# Patient Record
Sex: FEMALE | Employment: OTHER | URBAN - METROPOLITAN AREA
[De-identification: names, ages, dates, MRNs, and addresses within clinical notes are randomized per-mention and may not be internally consistent; named-entity substitution may affect disease eponyms.]

---

## 2023-12-07 DIAGNOSIS — I10 PRIMARY HYPERTENSION: Primary | ICD-10-CM

## 2023-12-07 RX ORDER — SPIRONOLACTONE 50 MG/1
50 TABLET, FILM COATED ORAL DAILY
Qty: 30 TABLET | Refills: 0 | Status: SHIPPED | OUTPATIENT
Start: 2023-12-07 | End: 2024-01-04 | Stop reason: SDUPTHER

## 2023-12-07 RX ORDER — SPIRONOLACTONE 50 MG/1
50 TABLET, FILM COATED ORAL DAILY
COMMUNITY
Start: 2021-07-01 | End: 2023-12-07 | Stop reason: SDUPTHER

## 2023-12-13 ENCOUNTER — ANCILLARY PROCEDURE (OUTPATIENT)
Dept: RADIOLOGY | Facility: CLINIC | Age: 78
End: 2023-12-13
Payer: MEDICARE

## 2023-12-13 ENCOUNTER — LAB (OUTPATIENT)
Dept: LAB | Facility: LAB | Age: 78
End: 2023-12-13
Payer: MEDICARE

## 2023-12-13 ENCOUNTER — OFFICE VISIT (OUTPATIENT)
Dept: PRIMARY CARE | Facility: CLINIC | Age: 78
End: 2023-12-13
Payer: MEDICARE

## 2023-12-13 VITALS
BODY MASS INDEX: 36.57 KG/M2 | HEART RATE: 60 BPM | SYSTOLIC BLOOD PRESSURE: 138 MMHG | HEIGHT: 63 IN | TEMPERATURE: 96 F | WEIGHT: 206.4 LBS | DIASTOLIC BLOOD PRESSURE: 80 MMHG | OXYGEN SATURATION: 99 %

## 2023-12-13 DIAGNOSIS — E78.2 HYPERLIPIDEMIA, MIXED: ICD-10-CM

## 2023-12-13 DIAGNOSIS — E78.2 MIXED HYPERLIPIDEMIA: ICD-10-CM

## 2023-12-13 DIAGNOSIS — M54.50 ACUTE BILATERAL LOW BACK PAIN WITHOUT SCIATICA: ICD-10-CM

## 2023-12-13 DIAGNOSIS — I10 PRIMARY HYPERTENSION: ICD-10-CM

## 2023-12-13 DIAGNOSIS — E78.2 HYPERLIPIDEMIA, MIXED: Primary | ICD-10-CM

## 2023-12-13 PROBLEM — N18.32 STAGE 3B CHRONIC KIDNEY DISEASE (MULTI): Status: ACTIVE | Noted: 2023-12-13

## 2023-12-13 PROBLEM — E55.9 VITAMIN D DEFICIENCY: Status: ACTIVE | Noted: 2023-12-13

## 2023-12-13 PROBLEM — D63.0 ANEMIA IN NEOPLASTIC DISEASE: Status: ACTIVE | Noted: 2022-06-22

## 2023-12-13 PROBLEM — M51.16 LUMBAR DISC DISEASE WITH RADICULOPATHY: Status: ACTIVE | Noted: 2023-12-13

## 2023-12-13 PROBLEM — G62.9 PERIPHERAL NEUROPATHY: Status: ACTIVE | Noted: 2023-12-13

## 2023-12-13 PROBLEM — H35.033 HYPERTENSIVE RETINOPATHY, BILATERAL: Status: ACTIVE | Noted: 2019-02-15

## 2023-12-13 PROBLEM — M25.50 AROMATASE INHIBITOR-ASSOCIATED ARTHRALGIA: Status: ACTIVE | Noted: 2023-07-07

## 2023-12-13 PROBLEM — J31.0 CHRONIC RHINITIS: Status: ACTIVE | Noted: 2023-12-13

## 2023-12-13 PROBLEM — Z85.3 HISTORY OF MALIGNANT NEOPLASM OF BREAST: Status: ACTIVE | Noted: 2022-06-22

## 2023-12-13 PROBLEM — Z90.11 S/P RIGHT MASTECTOMY: Status: ACTIVE | Noted: 2023-01-04

## 2023-12-13 PROBLEM — H25.013 CORTICAL SENILE CATARACT OF BOTH EYES: Status: ACTIVE | Noted: 2019-02-15

## 2023-12-13 PROBLEM — T45.1X5A AROMATASE INHIBITOR-ASSOCIATED ARTHRALGIA: Status: ACTIVE | Noted: 2023-07-07

## 2023-12-13 PROBLEM — H35.89 RPE MOTTLING OF MACULA: Status: ACTIVE | Noted: 2019-02-15

## 2023-12-13 PROBLEM — E83.52 HYPERCALCEMIA: Status: ACTIVE | Noted: 2023-12-13

## 2023-12-13 PROBLEM — R60.0 BILATERAL LOWER EXTREMITY EDEMA: Status: ACTIVE | Noted: 2023-07-07

## 2023-12-13 PROBLEM — E66.9 OBESITY WITH BODY MASS INDEX 30 OR GREATER: Status: ACTIVE | Noted: 2023-12-13

## 2023-12-13 PROBLEM — H16.223 KERATOCONJUNCTIVITIS SICCA, NOT SPECIFIED AS SJOGREN'S, BILATERAL: Status: ACTIVE | Noted: 2019-02-15

## 2023-12-13 PROBLEM — I89.0 LYMPHEDEMA OF RIGHT ARM: Status: ACTIVE | Noted: 2023-07-07

## 2023-12-13 PROBLEM — C50.911 MALIGNANT NEOPLASM OF RIGHT FEMALE BREAST (MULTI): Status: ACTIVE | Noted: 2019-02-15

## 2023-12-13 LAB
ALT SERPL W P-5'-P-CCNC: 16 U/L (ref 7–45)
ANION GAP SERPL CALC-SCNC: 16 MMOL/L (ref 10–20)
AST SERPL W P-5'-P-CCNC: 17 U/L (ref 9–39)
BASOPHILS # BLD AUTO: 0.04 X10*3/UL (ref 0–0.1)
BASOPHILS NFR BLD AUTO: 0.7 %
BUN SERPL-MCNC: 36 MG/DL (ref 6–23)
CALCIUM SERPL-MCNC: 10 MG/DL (ref 8.6–10.6)
CHLORIDE SERPL-SCNC: 105 MMOL/L (ref 98–107)
CHOLEST SERPL-MCNC: 208 MG/DL (ref 0–199)
CHOLESTEROL/HDL RATIO: 3.3
CO2 SERPL-SCNC: 23 MMOL/L (ref 21–32)
CREAT SERPL-MCNC: 1.17 MG/DL (ref 0.5–1.05)
EOSINOPHIL # BLD AUTO: 0.12 X10*3/UL (ref 0–0.4)
EOSINOPHIL NFR BLD AUTO: 2.2 %
ERYTHROCYTE [DISTWIDTH] IN BLOOD BY AUTOMATED COUNT: 13.8 % (ref 11.5–14.5)
GFR SERPL CREATININE-BSD FRML MDRD: 48 ML/MIN/1.73M*2
GLUCOSE SERPL-MCNC: 99 MG/DL (ref 74–99)
HCT VFR BLD AUTO: 40.9 % (ref 36–46)
HDLC SERPL-MCNC: 62.7 MG/DL
HGB BLD-MCNC: 12.8 G/DL (ref 12–16)
IMM GRANULOCYTES # BLD AUTO: 0.02 X10*3/UL (ref 0–0.5)
IMM GRANULOCYTES NFR BLD AUTO: 0.4 % (ref 0–0.9)
LDLC SERPL CALC-MCNC: 111 MG/DL
LYMPHOCYTES # BLD AUTO: 1.51 X10*3/UL (ref 0.8–3)
LYMPHOCYTES NFR BLD AUTO: 28.1 %
MCH RBC QN AUTO: 30.8 PG (ref 26–34)
MCHC RBC AUTO-ENTMCNC: 31.3 G/DL (ref 32–36)
MCV RBC AUTO: 99 FL (ref 80–100)
MONOCYTES # BLD AUTO: 0.7 X10*3/UL (ref 0.05–0.8)
MONOCYTES NFR BLD AUTO: 13 %
NEUTROPHILS # BLD AUTO: 2.99 X10*3/UL (ref 1.6–5.5)
NEUTROPHILS NFR BLD AUTO: 55.6 %
NON HDL CHOLESTEROL: 145 MG/DL (ref 0–149)
NRBC BLD-RTO: 0 /100 WBCS (ref 0–0)
PLATELET # BLD AUTO: 283 X10*3/UL (ref 150–450)
POTASSIUM SERPL-SCNC: 4.7 MMOL/L (ref 3.5–5.3)
RBC # BLD AUTO: 4.15 X10*6/UL (ref 4–5.2)
SODIUM SERPL-SCNC: 139 MMOL/L (ref 136–145)
TRIGL SERPL-MCNC: 170 MG/DL (ref 0–149)
VLDL: 34 MG/DL (ref 0–40)
WBC # BLD AUTO: 5.4 X10*3/UL (ref 4.4–11.3)

## 2023-12-13 PROCEDURE — 85025 COMPLETE CBC W/AUTO DIFF WBC: CPT

## 2023-12-13 PROCEDURE — 99214 OFFICE O/P EST MOD 30 MIN: CPT | Performed by: INTERNAL MEDICINE

## 2023-12-13 PROCEDURE — 3075F SYST BP GE 130 - 139MM HG: CPT | Performed by: INTERNAL MEDICINE

## 2023-12-13 PROCEDURE — 1159F MED LIST DOCD IN RCRD: CPT | Performed by: INTERNAL MEDICINE

## 2023-12-13 PROCEDURE — 84450 TRANSFERASE (AST) (SGOT): CPT

## 2023-12-13 PROCEDURE — 1160F RVW MEDS BY RX/DR IN RCRD: CPT | Performed by: INTERNAL MEDICINE

## 2023-12-13 PROCEDURE — 80061 LIPID PANEL: CPT

## 2023-12-13 PROCEDURE — 72100 X-RAY EXAM L-S SPINE 2/3 VWS: CPT | Mod: FY

## 2023-12-13 PROCEDURE — 72100 X-RAY EXAM L-S SPINE 2/3 VWS: CPT | Performed by: RADIOLOGY

## 2023-12-13 PROCEDURE — 1036F TOBACCO NON-USER: CPT | Performed by: INTERNAL MEDICINE

## 2023-12-13 PROCEDURE — 80048 BASIC METABOLIC PNL TOTAL CA: CPT

## 2023-12-13 PROCEDURE — 36415 COLL VENOUS BLD VENIPUNCTURE: CPT

## 2023-12-13 PROCEDURE — 3079F DIAST BP 80-89 MM HG: CPT | Performed by: INTERNAL MEDICINE

## 2023-12-13 PROCEDURE — 84460 ALANINE AMINO (ALT) (SGPT): CPT

## 2023-12-13 RX ORDER — BISOPROLOL FUMARATE 10 MG/1
TABLET, FILM COATED ORAL
COMMUNITY
Start: 2014-09-19 | End: 2024-01-24 | Stop reason: SDUPTHER

## 2023-12-13 RX ORDER — CHOLECALCIFEROL (VITAMIN D3) 50 MCG
1 TABLET ORAL DAILY
COMMUNITY
Start: 2019-01-14

## 2023-12-13 RX ORDER — LOTEPREDNOL ETABONATE 5 MG/G
OINTMENT OPHTHALMIC
COMMUNITY

## 2023-12-13 RX ORDER — BESIFLOXACIN 6 MG/ML
1 SUSPENSION OPHTHALMIC 3 TIMES DAILY
COMMUNITY
Start: 2022-05-04

## 2023-12-13 RX ORDER — MELOXICAM 7.5 MG/1
7.5 TABLET ORAL DAILY
Qty: 30 TABLET | Refills: 0 | Status: SHIPPED | OUTPATIENT
Start: 2023-12-13 | End: 2024-01-09

## 2023-12-13 RX ORDER — CALCIUM CARBONATE/VITAMIN D3 600MG-5MCG
1 TABLET ORAL DAILY
COMMUNITY
Start: 2015-10-06

## 2023-12-13 RX ORDER — TIZANIDINE 4 MG/1
4 TABLET ORAL EVERY 6 HOURS PRN
Qty: 30 TABLET | Refills: 0 | Status: SHIPPED | OUTPATIENT
Start: 2023-12-13 | End: 2023-12-23

## 2023-12-13 RX ORDER — LETROZOLE 2.5 MG/1
TABLET, FILM COATED ORAL
COMMUNITY
Start: 2019-01-02

## 2023-12-13 RX ORDER — CYCLOSPORINE 0.5 MG/ML
1 EMULSION OPHTHALMIC 2 TIMES DAILY
COMMUNITY

## 2023-12-13 RX ORDER — FENOFIBRATE 145 MG/1
TABLET, FILM COATED ORAL
COMMUNITY
Start: 2010-08-23 | End: 2024-01-24 | Stop reason: SDUPTHER

## 2023-12-13 RX ORDER — BROMFENAC SODIUM 0.7 MG/ML
1 SOLUTION/ DROPS OPHTHALMIC
COMMUNITY
Start: 2022-05-04

## 2023-12-13 ASSESSMENT — PATIENT HEALTH QUESTIONNAIRE - PHQ9
2. FEELING DOWN, DEPRESSED OR HOPELESS: NOT AT ALL
1. LITTLE INTEREST OR PLEASURE IN DOING THINGS: NOT AT ALL
SUM OF ALL RESPONSES TO PHQ9 QUESTIONS 1 AND 2: 0

## 2023-12-13 NOTE — PROGRESS NOTES
"Subjective   Patient ID: Christelle Paul is a 78 y.o. female who presents for Numbness (Pt has numbness in hands and toes, also has a back pain for about 2 weeks now.).    HPI   For 2 weeks pain in low back and hips.  No numbness actually.has stiffness  No fall  Was standing for a while and had spasm.  Pain gets worse with standing and walking.  No pain if she is sitting or lying down.  No bowel or bladder symptoms  Seen hematology oncology.  Mammogram is okay    Taking BP medications as prescribed.  Does not check at home.  No headache or dizziness or double vision  No neck pain now  No significant water retention unless she travels in an airplane    Taking fenofibrate.  No side effects  Continues to be on letrozole  Review of Systems  Constitutional: no fever, no chills and no recent weight gain.   ENT: no nasal discharge and no hoarseness.   Respiratory: no cough, no wheezing that is consistent with asthma, no orthopnea and no Postural Nocturnal Dyspnea.   Gastrointestinal: no abdominal pain, no constipation, no melena and no diarrhea.   Objective   Pulse 60   Temp 35.6 °C (96 °F)   Ht 1.6 m (5' 3\")   Wt 93.6 kg (206 lb 6.4 oz)   SpO2 99%   BMI 36.56 kg/m²     Physical Exam  In general, well-appearing, not in acute distress, alert and oriented.  HEENT: Pupils PERRLA. No pallor or icterus  Neck: No lymphadenopathy,. No enlargement of thyroid.No JVD  Chest: Clear to auscultation, good air entry.  CVS: S1 and S2 regular. No murmur, no gallop, S3 or S4. trace peripheral edema. No carotid bruit.  Abdomen: Soft, no tenderness, no hepatosplenomegaly.  Extremities: No calf tenderness. No knee or ankle effusion. No finger synovitis. No clubbing or cyanosis.  Psych: Mood and affect normal. Good judgment and insight  Neuro exam shows no focal findings.  Has difficulty getting up due to back pain.  Has tightness of gluteus muscles.  No spinal tenderness.  SLR negative bilateral.  Gait slow with a cane.  Not able to " elicit knee reflexes.  Does not have weakness.  Assessment/Plan   Problem List Items Addressed This Visit             ICD-10-CM    Hypertension I10    Relevant Orders    CBC and Auto Differential    Basic Metabolic Panel    Hyperlipidemia, mixed - Primary E78.2    Relevant Orders    CBC and Auto Differential    Lipid Panel    Basic Metabolic Panel     Other Visit Diagnoses         Codes    Mixed hyperlipidemia     E78.2    Relevant Orders    Alanine Aminotransferase    Aspartate Aminotransferase    Acute bilateral low back pain without sciatica     M54.50    Relevant Medications    tiZANidine (Zanaflex) 4 mg tablet    meloxicam (Mobic) 7.5 mg tablet    Other Relevant Orders    XR lumbar spine 2-3 views          Blood pressure is under control.  Continue same treatment  Will check renal function  Check lipids and liver enzymes  Continue fenofibrate  She is up-to-date with breast cancer follow-up  Patient has acute back pain and hip pain.  Will get an x-ray due to history of breast cancer  Likely myofascial  Will order meloxicam and muscle relaxer  Apply heat and do gentle Sarge and stretches  Will refer to physical therapy if symptoms are not improving in a couple of weeks  Schedule wellness exam

## 2023-12-14 ENCOUNTER — TELEPHONE (OUTPATIENT)
Dept: PRIMARY CARE | Facility: CLINIC | Age: 78
End: 2023-12-14
Payer: MEDICARE

## 2023-12-14 NOTE — TELEPHONE ENCOUNTER
----- Message from Margoth Ceballos MD sent at 12/14/2023  1:06 PM EST -----  Advise blood work is okay.  Continue same treatment plan    PT aware.

## 2023-12-14 NOTE — RESULT ENCOUNTER NOTE
Advise that x-ray shows arthritis changes.  L4-5 is slightly out of position .  If pain is not improving I recommend physical therapy

## 2023-12-15 ENCOUNTER — TELEPHONE (OUTPATIENT)
Dept: PRIMARY CARE | Facility: CLINIC | Age: 78
End: 2023-12-15
Payer: MEDICARE

## 2023-12-15 NOTE — TELEPHONE ENCOUNTER
----- Message from Margoth Ceballos MD sent at 12/14/2023  5:45 PM EST -----  Advise that x-ray shows arthritis changes.  L4-5 is slightly out of position .  If pain is not improving I recommend physical therapy    Left VM to call back for results.

## 2024-01-04 DIAGNOSIS — I10 PRIMARY HYPERTENSION: ICD-10-CM

## 2024-01-04 RX ORDER — SPIRONOLACTONE 50 MG/1
50 TABLET, FILM COATED ORAL DAILY
Qty: 90 TABLET | Refills: 3 | Status: SHIPPED | OUTPATIENT
Start: 2024-01-04 | End: 2025-01-03

## 2024-01-24 DIAGNOSIS — I10 PRIMARY HYPERTENSION: Primary | ICD-10-CM

## 2024-01-24 DIAGNOSIS — M54.50 ACUTE BILATERAL LOW BACK PAIN WITHOUT SCIATICA: ICD-10-CM

## 2024-01-24 DIAGNOSIS — E78.2 HYPERLIPIDEMIA, MIXED: ICD-10-CM

## 2024-01-24 RX ORDER — MELOXICAM 7.5 MG/1
7.5 TABLET ORAL DAILY
Qty: 30 TABLET | Refills: 0 | Status: SHIPPED | OUTPATIENT
Start: 2024-01-24 | End: 2024-02-07 | Stop reason: ALTCHOICE

## 2024-01-24 RX ORDER — BISOPROLOL FUMARATE 10 MG/1
10 TABLET, FILM COATED ORAL DAILY
Qty: 90 TABLET | Refills: 3 | Status: SHIPPED | OUTPATIENT
Start: 2024-01-24 | End: 2025-01-23

## 2024-01-24 RX ORDER — FENOFIBRATE 145 MG/1
145 TABLET, FILM COATED ORAL DAILY
Qty: 90 TABLET | Refills: 3 | Status: SHIPPED | OUTPATIENT
Start: 2024-01-24 | End: 2025-01-23

## 2024-02-07 ENCOUNTER — TELEPHONE (OUTPATIENT)
Dept: PRIMARY CARE | Facility: CLINIC | Age: 79
End: 2024-02-07
Payer: COMMERCIAL

## 2024-02-07 DIAGNOSIS — M51.16 LUMBAR DISC DISEASE WITH RADICULOPATHY: Primary | ICD-10-CM

## 2024-02-07 RX ORDER — GABAPENTIN 100 MG/1
100 CAPSULE ORAL 2 TIMES DAILY
Qty: 60 CAPSULE | Refills: 0 | Status: SHIPPED | OUTPATIENT
Start: 2024-02-07 | End: 2024-03-15

## 2024-02-07 NOTE — TELEPHONE ENCOUNTER
The Meloxicam prescribed on 1.24.24 is not helping for back pain. She was hoping for something stronger and a referral for Physical Therapy.

## 2024-02-28 ENCOUNTER — EVALUATION (OUTPATIENT)
Dept: PHYSICAL THERAPY | Facility: CLINIC | Age: 79
End: 2024-02-28
Payer: MEDICARE

## 2024-02-28 DIAGNOSIS — M51.16 LUMBAR DISC DISEASE WITH RADICULOPATHY: ICD-10-CM

## 2024-02-28 PROCEDURE — 97110 THERAPEUTIC EXERCISES: CPT | Mod: GP | Performed by: PHYSICAL THERAPIST

## 2024-02-28 PROCEDURE — 97161 PT EVAL LOW COMPLEX 20 MIN: CPT | Mod: GP | Performed by: PHYSICAL THERAPIST

## 2024-02-28 ASSESSMENT — COLUMBIA-SUICIDE SEVERITY RATING SCALE - C-SSRS
1. IN THE PAST MONTH, HAVE YOU WISHED YOU WERE DEAD OR WISHED YOU COULD GO TO SLEEP AND NOT WAKE UP?: NO
6. HAVE YOU EVER DONE ANYTHING, STARTED TO DO ANYTHING, OR PREPARED TO DO ANYTHING TO END YOUR LIFE?: NO
2. HAVE YOU ACTUALLY HAD ANY THOUGHTS OF KILLING YOURSELF?: NO

## 2024-02-28 ASSESSMENT — ENCOUNTER SYMPTOMS
OCCASIONAL FEELINGS OF UNSTEADINESS: 0
LOSS OF SENSATION IN FEET: 0
DEPRESSION: 0

## 2024-02-28 ASSESSMENT — PATIENT HEALTH QUESTIONNAIRE - PHQ9
1. LITTLE INTEREST OR PLEASURE IN DOING THINGS: NOT AT ALL
2. FEELING DOWN, DEPRESSED OR HOPELESS: NOT AT ALL
SUM OF ALL RESPONSES TO PHQ9 QUESTIONS 1 AND 2: 0

## 2024-02-28 NOTE — PROGRESS NOTES
Patient Name Christelle Paul  MRN: [id number]  Today's Date: 24  Time Calculation  Start Time: 0130  Stop Time: 215  Time Calculation (min): 45 min    Insurance:   Visit #: 1  Insurance Reviewed  (per information provided by  pre-cert team)   [facility] Certification date range:  24/24    Therapy Diagnoses:   1. Lumbar disc disease with radiculopathy  Referral to Physical Therapy        General:  Reason for visit: LS pain   Referred by: Margoth Gallegos MD appt:  prn after PT  Preferred Name:  Christelle  Script:  PT  Onset Date:  24  Most recent assessment/re-assessment: 24  Email/phone #:  prefers printed copy   Access Code: [email or web address]    Assessment:    Evolving with changing characteristics  Level of complexity:  low  Patient with flare up of OA in the LS, needs work on/Skilled PT for ROM, flexibility, dynamic stabilization, strength, posture, body mechanics and gait/stairs for improved overall function.       Problems:    Pain:  _x__  Posture/Body mechanics deficits:  _x__  Decreased knowledge HEP:  _x__  Decreased knowledge of Precautions:  _x__  Activity Limitations:   _x__  ADL's/IADL's/Self-care skills:  _x__  ROM/Joint Mobility:  _x__  Strength:  __x_  Decreased functional level:   _x__  Flexibility:  __x_  Tenderness/decreased soft tissue mobility:  __x_  Gait/Stairs:  ___x  Fall Risk:  __x_  Balance:  ___  Edema:  ___  Participation restrictions:  ___  Sensory:  ___  Transfers:  ___  Decreased knowledge of brace:   ___  Other:  ___    X Indicates included in problem list    Goals:    STG:    -Increased postural awareness  -Compliant with HEP.   LTG:  by discharge  -Increased postural awareness and posture WFL.  - pain to:  2/10 and patient I with self management of symptoms.   -Decreased pain and increased function with ADL's and IADL's.  Improve Oswestry  to: 20%  limitation of function.  -Normal gait on level and uneven surfaces community level distances for improved  "function in the community with least assistive device.  -Normal reciprocal pattern on stairs for improved function to all levels of home with rail.    -TUG to within norms for age for decreased risk for falls.  -Increase trunk AROM to WFL for improved function with dressing and driving.    -Increase trunk strength and stability and R/L LE strength to WFL for improved function with chores.  -Increase B LE flexibility to WFL.    -Decrease B /lower quarter tenderness 50-75% per patient report.  -I and compliant with HEP and proper: LE/lower back care.     Rehab potential to achieve the above goals is good.    Patient is aware of diagnosis and prognosis and agrees with established plan of care and goals.    Plan:   Skilled PT 1-2 x/week for 12 weeks( Until goals achieved, maximum rehab potential has been achieved, or patient has plateaued)  for:    Aquatics  __x___  CP   __x__  Dry needling  ____  Education  __x__  Electrical Stimulation  __x__  Gait training  __x__  HEP  __x__  Manual  _x___  Mechanical Traction  ____  NMR  __x__  Self-care/home management  __x__  Therapeutic Exercise   __x__  Therapeutic Activities  __x__  US  __x__  Work Conditioning  ____  Re-assessment  __x__  Other  ____    X Indicates included in treatment plan    PT for Nu-step for functional mobility and soft tissue warm up for more efficient stretching, work on ROM, flexibility, dynamic stabilization, strength, posture, body mechanics and gait/stairs for improved overall function.   Manual and modalities prn.     Subjective:  HPI: patient's son present for eval to help translate, and will try to come for appointments, but patient is able to understand English.  Patient \"crystal\" back in 12-23 and had onset of LS pain.  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  Patient had x-rays that showed mild OA and anterolisthesis L4-5.    Pain  Type of pain:  8/10 sharp LS  What " "increases pain: standing/walking  What decreases pain:  sitting/just started Gabapentin    Medical Hx/  Fall Risk:  low  Steadi:  4 yes  Precautions: s/p Breast CA 2012 with surgery, HTN, OA, L ankle fracture with ORIF, see meds in chart.     Human Trafficking risk:  No    Patient goal:  Decreased pain and increased function.   Patient is aware of diagnosis and prognosis.    Living Environment:  multi-level home with stairs with rail, first floor laundry  Social Support:  lives with:  son currently and likely long term  DME:    shower chair     Prior Function:   ambulated without device prior to onset    Function:    A and O x 3  Sleep:  WFL/ instructed in proper postures.  ADL's:  using chair to shower and has been for a while, sits to dress.   Chores:  pain with chores requiring standing.  Likes to garden.   Driving:  WNL  Work: retired  Recreation: out to shop and go to [employer], out with family, not going to [employer] currently due to difficulty with standing.   Sitting:  unlimited Standin'   Walkin' with cane    Objective:    Outcome Measures:  Other Measures  Oswestry Disablity Index (PAULIE): 38% limitation of function.     Posture:  moderately for flexed posture, increased lumbar lordosis and pelvic landmarks symmetrical.      Gait/Stairs: moderately decreased trunk rotation and hip extension, wide STEPHANIE, ambulates with straight cane, up and down stairs with step to pattern with rail and cane and either LE WB.      TU.2\" straight cane and B UE on arm rests.   normals:  60-69 years of age (7.1 - 9.0 seconds)  70 - 79 years of age: (8.2 - 10.2 seconds)**  80 - 99 years of age (10 - 12.7 seconds)    Palpation:  moderate tenderness B lumbar paraspinals and piriformis.  L > R distal LE pitting edema, per son and patient, MD is aware.      ROM:  Trunk Flexion: 10\" 3rd to floor  Extension: 0/12 pain  RSB:  WFL ff component  LSB:  WFL ff component  RR: 40%  LR: 40%    MMT:  Abd: 1+/5  Bridge: 10%  B LE " "myotomes:  WNL and symmetrical    Flexibility:  Hamstrings:  90/90:  R:  -28  L: -28  Heelcords:   0 B  Hip flexors: mod B  Gluts: mod B  Piriformis: mod B    Neuro:  (at re-assessment)    Treatment:    Evaluation:  30 minutes    **= HEP  NV= Next visit  np= not performed  nb= non-billable  G= group HEP= discharged to HEP    Therapeutic Exercise: 8  Nu-step(subjective taken/education):    NV    Standing hams and hip flexor/calf stretches: NV    Seated flexion stretch:  10/10\"**    LTR:  x 15/5\"**  B SKTC:  10/10\"**  B supine piriformis stretch:  3/30\"**    NMR:  2  T-band 3-way pull downs:  NV  T-band obliques: NV  T-band B pull for/back:  NV    Pelvic tilt:  10/5\"**  Bridges:  NV  PT hooklying with add set:  NV  PT hooklying with t-band abd:  NV     Education:  poc, anatomy, physiology, posture, body mechanics, safety awareness, HEP.  Preferred learning:  pictures, demonstration.  Demonstrated good understanding.                    Access Code: [email or web address]  URL: [email or web address]  Date: 02/28/2024  Prepared by: Narcisa Waggoner    Program Notes  Do these in bed not on the floor    Exercises  - Supine Lower Trunk Rotation  - 1 x daily - 7 x weekly - 2-3 sets - 10 reps  - Hooklying Single Knee to Chest  - 1 x daily - 7 x weekly - 1 sets - 10 reps - 10 second hold  - Supine Piriformis Stretch with Foot on Ground  - 1 x daily - 7 x weekly - 1 sets - 3 reps - 30 second hold  - Pelvic Tilt  - 1 x daily - 7 x weekly - 2-3 sets - 10 reps - 5 second hold  - Seated Lumbar Flexion Stretch  - 1 x daily - 7 x weekly - 1 sets - 10 reps - 10 second hold                    "

## 2024-02-28 NOTE — LETTER
February 28, 2024    N[place]aggoner[place] PT  [address] [facility] [address] [facility]  [facility] [address]    Patient: Christelle Paul   YOB: 1945   Date of Visit: 2/28/2024       Dear Margoth Ceballos MD  [address] [facility] [address] [address]  [place] [address]    The attached plan of care is being sent to you because your patient’s medical reimbursement requires that you certify the plan of care. Your signature is required to allow uninterrupted insurance coverage.      You may indicate your approval by signing below and faxing this form back to us at Dept Fax: [phone].    Please call Dept: [phone] with any questions or concerns.    Thank you for this referral,        Narcisa Waggoner, PT  [address] [address] [facility]  [address] [facility] [address]    Payer: Payor: MEDICARE / Plan: MEDICARE PART A AND B / Product Type: *No Product type* /                                                                         Date:     Dear Narcisa Waggoner, PT,     Re: Ms. Christelle Paul, MRN:[id number]    I certify that I have reviewed the attached plan of care and it is medically necessary for Ms. Christelle Paul (1945) who is under my care.          ______________________________________                    _________________  Provider name and credentials                                           Date and time                                                                                           Plan of Care 2/28/24   Effective from: 2/28/2024  Effective to: 5/27/2024    Plan ID: [id number]                Participants as of Finalize on 2/28/2024      Name Type Comments Contact Info    Margoth Ceballos MD [facility] Attributed Provider  [phone]    Narcisa Waggoner PT Physical Therapist  [phone]           Last Plan Note       Author: Narcisa Waggoner PT Status: Incomplete Last edited: 2/28/2024  1:30 PM         Patient Name Christelle Paul  MRN:  [id number]  Today's Date: 24  Time Calculation  Start Time: 0130  Stop Time: 5  Time Calculation (min): 45 min    Insurance:   Visit #: 1  Insurance Reviewed  (per information provided by  pre-cert team)   [facility] Certification date range:  24/24    Therapy Diagnoses:   1. Lumbar disc disease with radiculopathy  Referral to Physical Therapy        General:  Reason for visit: LS pain   Referred by: Margoth Gallegos MD appt:  prn after PT  Preferred Name:  Christelle  Script:  PT  Onset Date:  24  Most recent assessment/re-assessment: 24  Email/phone #:  prefers printed copy   Access Code: [email or web address]    Assessment:    Evolving with changing characteristics  Level of complexity:  low  Patient with flare up of OA in the LS, needs work on/Skilled PT for ROM, flexibility, dynamic stabilization, strength, posture, body mechanics and gait/stairs for improved overall function.       Problems:    Pain:  _x__  Posture/Body mechanics deficits:  _x__  Decreased knowledge HEP:  _x__  Decreased knowledge of Precautions:  _x__  Activity Limitations:   _x__  ADL's/IADL's/Self-care skills:  _x__  ROM/Joint Mobility:  _x__  Strength:  __x_  Decreased functional level:   _x__  Flexibility:  __x_  Tenderness/decreased soft tissue mobility:  __x_  Gait/Stairs:  ___x  Fall Risk:  __x_  Balance:  ___  Edema:  ___  Participation restrictions:  ___  Sensory:  ___  Transfers:  ___  Decreased knowledge of brace:   ___  Other:  ___    X Indicates included in problem list    Goals:    STG:    -Increased postural awareness  -Compliant with HEP.   LTG:  by discharge  -Increased postural awareness and posture WFL.  - pain to:  2/10 and patient I with self management of symptoms.   -Decreased pain and increased function with ADL's and IADL's.  Improve Oswestry  to: 20%  limitation of function.  -Normal gait on level and uneven surfaces community level distances for improved function in the community with least  "assistive device.  -Normal reciprocal pattern on stairs for improved function to all levels of home with rail.    -TUG to within norms for age for decreased risk for falls.  -Increase trunk AROM to WFL for improved function with dressing and driving.    -Increase trunk strength and stability and R/L LE strength to WFL for improved function with chores.  -Increase B LE flexibility to WFL.    -Decrease B /lower quarter tenderness 50-75% per patient report.  -I and compliant with HEP and proper: LE/lower back care.     Rehab potential to achieve the above goals is good.    Patient is aware of diagnosis and prognosis and agrees with established plan of care and goals.    Plan:   Skilled PT 1-2 x/week for 12 weeks( Until goals achieved, maximum rehab potential has been achieved, or patient has plateaued)  for:    Aquatics  __x___  CP   __x__  Dry needling  ____  Education  ___x_  Electrical Stimulation  __x__  Gait training  __x__  HEP  __x__  Manual  _x___  Mechanical Traction  ____  NMR  __x__  Self-care/home management  __x__  Therapeutic Exercise   ___x_  Therapeutic Activities  __x__  US  ___x_  Work Conditioning  ____  Re-assessment  ___x_  Other  ____    X Indicates included in treatment plan    PT for Nu-step for functional mobility and soft tissue warm up for more efficient stretching, work on ROM, flexibility, dynamic stabilization, strength, posture, body mechanics and gait/stairs for improved overall function.    Subjective:  HPI: patient's son present for eval to help translate, and will try to come for appointments, but patient is able to understand English.  Patient \"crystal\" back in 12-23 and had onset of LS pain.  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  Patient had x-rays that showed mild OA and anterolisthesis L4-5.    Pain  Type of pain:  8/10 sharp LS  What increases pain: standing/walking  What decreases pain:  " "sitting/just started Gabapentin    Medical Hx/  Fall Risk:  low  Steadi:  4 yes  Precautions: s/p Breast CA 2012 with surgery, HTN, OA, L ankle fracture with ORIF, see meds in chart.     Human Trafficking risk:  No    Patient goal:  Decreased pain and increased function.   Patient is aware of diagnosis and prognosis.    Living Environment:  multi-level home with stairs with rail, first floor laundry  Social Support:  lives with:  son currently and likely long term  DME:    shower chair     Prior Function:   ambulated without device prior to onset    Function:    A and O x 3  Sleep:  WFL/ instructed in proper postures.  ADL's:  using chair to shower and has been for a while, sits to dress.   Chores:  pain with chores requiring standing.  Likes to garden.   Driving:  WNL  Work: retired  Recreation: out to shop and go to [employer], out with family, not going to [employer] currently due to difficulty with standing.   Sitting:  unlimited Standin'   Walkin' with cane    Objective:    Outcome Measures:  Other Measures  Oswestry Disablity Index (PAULIE): 38% limitation of function.     Posture:  moderately for flexed posture, increased lumbar lordosis and pelvic landmarks symmetrical.      Gait/Stairs: moderately decreased trunk rotation and hip extension, wide STEPHANIE, ambulates with straight cane, up and down stairs with step to pattern with rail and cane and either LE WB.      TUG:  straight cane and B UE on arm rests.   normals:  60-69 years of age (7.1 - 9.0 seconds)  70 - 79 years of age: (8.2 - 10.2 seconds)**  80 - 99 years of age (10 - 12.7 seconds)    Palpation:  moderate tenderness B lumbar paraspinals and piriformis.  L > R distal LE pitting edema, per son and patient, MD is aware.      ROM:  Trunk Flexion: 10\" 3rd to floor  Extension: 0/12 pain  RSB:  WFL ff component  LSB:  WFL ff component  RR: 40%  LR: 40%    MMT:  Abd: 1+/5  Bridge: 10%  B LE myotomes:  WNL and symmetrical    Flexibility:  Hamstrings:  90/90:  " "R:  -28  L: -28  Heelcords:   0 B  Hip flexors: mod B  Gluts: mod B  Piriformis: mod B    Neuro:  (at re-assessment)    Treatment:    Evaluation:  30 minutes    **= HEP  NV= Next visit  np= not performed  nb= non-billable  G= group HEP= discharged to HEP    Therapeutic Exercise: 8  Nu-step(subjective taken/education):    NV    Standing hams and hip flexor/calf stretches: NV    Seated flexion stretch:  10/10\"**    LTR:  x 15/5\"**  B SKTC:  10/10\"**  B supine piriformis stretch:  3/30\"**    NMR:  2  T-band 3-way pull downs:  NV  T-band obliques: NV  T-band B pull for/back:  NV    Pelvic tilt:  10/5\"**  Bridges:  NV  PT hooklying with add set:  NV  PT hooklying with t-band abd:  NV     Education:  poc, anatomy, physiology, posture, body mechanics, safety awareness, HEP.  Preferred learning:  pictures, demonstration.  Demonstrated good understanding.                    Access Code: [email or web address]  URL: [email or web address]  Date: 02/28/2024  Prepared by: Narcisa Waggoner    Program Notes  Do these in bed not on the floor    Exercises  - Supine Lower Trunk Rotation  - 1 x daily - 7 x weekly - 2-3 sets - 10 reps  - Hooklying Single Knee to Chest  - 1 x daily - 7 x weekly - 1 sets - 10 reps - 10 second hold  - Supine Piriformis Stretch with Foot on Ground  - 1 x daily - 7 x weekly - 1 sets - 3 reps - 30 second hold  - Pelvic Tilt  - 1 x daily - 7 x weekly - 2-3 sets - 10 reps - 5 second hold  - Seated Lumbar Flexion Stretch  - 1 x daily - 7 x weekly - 1 sets - 10 reps - 10 second hold                           Current Participants as of 2/28/2024      Name Type Comments Contact Info    Margoth Ceballos MD [facility]  [phone]    Signature pending    Narcisa Waggoner, PT Physical Therapist  [phone]    Signature pending        "

## 2024-02-29 NOTE — PROGRESS NOTES
Physical Therapy Progress Notes    Patient Name Christelle Paul  MRN: [id number]  Today's Date: 03/01/24  Time Calculation  Start Time: 0902  Stop Time: 0944  Time Calculation (min): 42 min  Therapeutic Exercise -42 min    Insurance:   Visit #: 2  Insurance Reviewed  (per information provided by  pre-cert team)   [facility] Certification date range:  2-28-24/5-27-24    Therapy Diagnoses:   1. Lumbar disc disease with radiculopathy  Follow Up In Physical Therapy          General:  Reason for visit: LS pain   Referred by: Margoth Gallegos MD appt:  prn after PT  Preferred Name:  Christelle  Script:  PT  Onset Date:  12-1-24  Most recent assessment/re-assessment: 2-28-24  Email/phone #:  prefers printed copy   Access Code: [email or web address]    Assessment:  Patient tolerated well, patient able to understand all the exercises and participate well.  No complaint of pain after completing all the exercises.    Patient with flare up of OA in the LS, needs work on/Skilled PT for ROM, flexibility, dynamic stabilization, strength, posture, body mechanics and gait/stairs for improved overall function.       Plan: Continue with pain relieving exercises, trunk stabilization exercises.  Add T band 3 way rowing next session.  Skilled PT 1-2 x/week for 12 weeks( Until goals achieved, maximum rehab potential has been achieved, or patient has plateaued)  for:    Aquatics  __x___  CP   __x__  Dry needling  ____  Education  __x__  Electrical Stimulation  __x__  Gait training  __x__  HEP  __x__  Manual  _x___  Mechanical Traction  ____  NMR  __x__  Self-care/home management  __x__  Therapeutic Exercise   __x__  Therapeutic Activities  __x__  US  __x__  Work Conditioning  ____  Re-assessment  __x__  Other  ____    X Indicates included in treatment plan    PT for Nu-step for functional mobility and soft tissue warm up for more efficient stretching, work on ROM, flexibility, dynamic stabilization, strength, posture, body mechanics and gait/stairs  "for improved overall function.   Manual and modalities prn.     Subjective:  HPI: patient's son present for treatment to help translate, and will try to come for appointments, but patient is able to understand English And communicate.  Patient has been compliant with HEP  Pain  Type of pain:  7/10 sharp LS  Patient \"crystal\" back in 12-23 and had onset of LS pain.  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  Patient had x-rays that showed mild OA and anterolisthesis L4-5.    What increases pain: standing/walking  What decreases pain:  sitting/just started Gabapentin    Medical Hx/  Fall Risk:  low  Steadi:  4 yes  Precautions: s/p Breast CA 2012 with surgery, HTN, OA, L ankle fracture with ORIF, see meds in chart.     Human Trafficking risk:  No    Patient goal:  Decreased pain and increased function.   Patient is aware of diagnosis and prognosis.    Living Environment:  multi-level home with stairs with rail, first floor laundry  Social Support:  lives with:  son currently and likely long term  DME:    shower chair     Prior Function:   ambulated without device prior to onset    Objective:    Outcome Measures:   % limitation of function.     Posture:  moderately for flexed posture, increased lumbar lordosis and pelvic landmarks symmetrical.      Gait/Stairs: moderately decreased trunk rotation and hip extension, wide STEPHANIE, ambulates with straight cane, up and down stairs with step to pattern with rail and cane and either LE WB.        Neuro:  (at re-assessment)    Treatment:      **= HEP  NV= Next visit  np= not performed  nb= non-billable  G= group HEP= discharged to [facility]    Therapeutic Exercise: 42  Nu-step(subjective taken/education): 8 min level 3    Standing hams and hip flexor/calf stretches:     Seated flexion stretch:  10/10\"**    LTR:  x 15/5\"**  B SKTC:  10/10\"**  B supine piriformis stretch:  3/30\"**    NMR:     T-band 3-way pull " "downs:  NV  T-band obliques: NV  T-band B pull for/back:  NV    Pelvic tilt:  10/5\"  Isometric adductor squeezes with a ball x 10  Bridges:    PT hooklying with add set:  NV  PT hooklying with t-band abd:  NV     Education:  Technique with new exercises, posture, body mechanics, safety awareness, HEP.  Preferred learning:  pictures, demonstration.  Program Notes  Do these in bed not on the floor    "

## 2024-03-01 ENCOUNTER — TREATMENT (OUTPATIENT)
Dept: PHYSICAL THERAPY | Facility: CLINIC | Age: 79
End: 2024-03-01
Payer: MEDICARE

## 2024-03-01 DIAGNOSIS — M51.16 LUMBAR DISC DISEASE WITH RADICULOPATHY: ICD-10-CM

## 2024-03-01 PROCEDURE — 97110 THERAPEUTIC EXERCISES: CPT | Mod: GP

## 2024-03-06 ENCOUNTER — TREATMENT (OUTPATIENT)
Dept: PHYSICAL THERAPY | Facility: CLINIC | Age: 79
End: 2024-03-06
Payer: MEDICARE

## 2024-03-06 DIAGNOSIS — M51.16 LUMBAR DISC DISEASE WITH RADICULOPATHY: ICD-10-CM

## 2024-03-06 PROCEDURE — 97112 NEUROMUSCULAR REEDUCATION: CPT | Mod: GP,CQ

## 2024-03-06 PROCEDURE — 97110 THERAPEUTIC EXERCISES: CPT | Mod: GP,CQ

## 2024-03-06 NOTE — PROGRESS NOTES
"     Physical Therapy Progress Notes    Patient Name Christelle Paul  MRN: [id number]  Today's Date: 03/06/24  Time Calculation  Start Time: 0745  Stop Time: 0830  Time Calculation (min): 45 min  Therapeutic Exercise     Insurance:   Visit #: 3  Insurance Reviewed  (per information provided by  pre-cert team)   [facility] Certification date range:  2-28-24/5-27-24    Therapy Diagnoses:   1. Lumbar disc disease with radiculopathy  Follow Up In Physical Therapy          General:  Reason for visit: LS pain   Referred by: Margoth Gallegos MD appt:  prn after PT  Preferred Name:  Christelle  Script:  PT  Onset Date:  12-1-24  Most recent assessment/re-assessment: 2-28-24  Email/phone #:  prefers printed copy   Access Code: [email or web address]    Assessment:  Patient tolerated well, patient able to understand all the exercises and participate well.    Did report slight increase in pain post standing and instructed to do seated stretch which decreased sx.  Overall pain decreased to a 5 post rx.    Justification for continued skilled care: needs Skilled PT for ROM, flexibility, dynamic stabilization, strength, posture, body mechanics and gait/stairs for improved overall function.       Plan: Continue with pain relieving exercises, progress stabilization per pt. Tolerance.        Subjective:  HPI: patient's son present for treatment to help translate, and will try to come for appointments, but patient is able to understand English And communicate.  Patient has been compliant with HEP, reports same pain level but being a little better.Using cane all the time due to pain.  Pain  Type of pain:  7/10 sharp LS  Patient \"crystal\" back in 12-23 and had onset of LS pain.  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  Patient had x-rays that showed mild OA and anterolisthesis L4-5.    What increases pain: standing/walking  What decreases pain:  sitting/just started " "Gabapentin    Medical Hx/  Fall Risk:  low  Steadi:  4 yes  Precautions: s/p Breast CA 2012 with surgery, HTN, OA, L ankle fracture with ORIF, see meds in chart.     Human Trafficking risk:  No    Objective:    .   Posture:  moderately for flexed posture, increased lumbar lordosis and pelvic landmarks symmetrical.      Gait/Stairs: moderately decreased trunk rotation and hip extension, wide STEPHANIE, ambulates with straight cane, up and down stairs with step to pattern with rail and cane and either LE WB.        Treatment:      **= HEP  NV= Next visit  np= not performed  nb= non-billable  G= group HEP= discharged to [facility]    Therapeutic Exercise: 25  Nu-step(subjective taken/education): 8 min level 3    Standing hams and hip flexor/calf stretches: 30 sec 2x each    Seated flexion stretch: with green swiss ball  10/10\"** 5x to each side 10 sec each    LTR:  x 15/5\"** HEP  B SKTC:  10/10\"**  B supine piriformis stretch:  3/30\"**    NMR:  20  T-band 3-way pull downs:  NV  T-band obliques: NV  T-band B pull for/back:  red 15x **    Pelvic tilt:  10/5\" stopped due to R mid back pain  Isometric adductor squeezes with a ball x 10  Bridges:  10x /5 sec **  PT hooklying with t-band abd:  NV     Education:  Technique with new exercises, posture, body mechanics, safety awareness, HEP.  Reinstructed in standing flexibility exercises issued last session  Access Code: [email or web address]  URL: [email or web address]  Date: 03/06/2024  Prepared by: Tamara    Program Notes  Do these in bed not on the floor    Exercises  - Supine Lower Trunk Rotation  - 1 x daily - 7 x weekly - 2-3 sets - 10 reps  - Hooklying Single Knee to Chest  - 1 x daily - 7 x weekly - 1 sets - 10 reps - 10 second hold  - Supine Piriformis Stretch with Foot on Ground  - 1 x daily - 7 x weekly - 1 sets - 3 reps - 30 second hold  - Pelvic Tilt  - 1 x daily - 7 x weekly - 2-3 sets - 10 reps - 5 second hold  - Seated Lumbar Flexion Stretch  - 1 x daily - 7 x " weekly - 1 sets - 10 reps - 10 second hold  - Supine Bridge  - 1 x daily - 7 x weekly - 2 sets - 10 reps - 5 hold

## 2024-03-13 ENCOUNTER — TREATMENT (OUTPATIENT)
Dept: PHYSICAL THERAPY | Facility: CLINIC | Age: 79
End: 2024-03-13
Payer: MEDICARE

## 2024-03-13 DIAGNOSIS — M51.16 LUMBAR DISC DISEASE WITH RADICULOPATHY: ICD-10-CM

## 2024-03-13 PROCEDURE — 97112 NEUROMUSCULAR REEDUCATION: CPT | Mod: GP,CQ

## 2024-03-13 PROCEDURE — 97110 THERAPEUTIC EXERCISES: CPT | Mod: GP,CQ

## 2024-03-13 NOTE — PROGRESS NOTES
"     Physical Therapy Progress Notes    Patient Name Christelle Paul  MRN: [id number]  Today's Date: 03/13/24  Time Calculation  Start Time: 0745  Stop Time: 0830  Time Calculation (min): 45 min      Insurance:   Visit #: 4  Insurance Reviewed  (per information provided by  pre-cert team)   [facility] Certification date range:  2-28-24/5-27-24    Therapy Diagnoses:   1. Lumbar disc disease with radiculopathy  Follow Up In Physical Therapy          General:  Reason for visit: LS pain   Referred by: Margoth Gallegos MD appt:  prn after PT  Preferred Name:  Christelle  Script:  PT  Onset Date:  12-1-24  Most recent assessment/re-assessment: 2-28-24  Email/phone #:  prefers printed copy   Access Code: [email or web address]    Assessment:  Patient tolerated well, patient able to understand all the exercises and participate well.    Improved tolerance for standing exercises today.  Updated HEP  Overall pain decreased to a 5 post rx.    Justification for continued skilled care: needs Skilled PT for ROM, flexibility, dynamic stabilization, strength, posture, body mechanics and gait/stairs for improved overall function.       Plan: Continue with pain relieving exercises, progress stabilization , attempt HL tband abduction        Subjective:  HPI: patient's son present for treatment to help translate, and will try to come for appointments, but patient is able to understand English And communicate.  Patient has been compliant with HEP, reports same pain level but being a little better.Using cane most of the time but can go without it at times at home  Pain  Type of pain:  6/10 sharp LS  Patient \"crystal\" back in 12-23 and had onset of LS pain.  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  Patient had x-rays that showed mild OA and anterolisthesis L4-5.    What increases pain: standing/walking  What decreases pain:  sitting/just started Gabapentin    Medical Hx/  Fall " "Risk:  low  Steadi:  4 yes  Precautions: s/p Breast CA 2012 with surgery, HTN, OA, L ankle fracture with ORIF, see meds in chart.     Human Trafficking risk:  No    Objective:    .   Posture:  moderately for flexed posture, increased lumbar lordosis and pelvic landmarks symmetrical.      Gait/Stairs: moderately decreased trunk rotation and hip extension, wide STEPHANIE, ambulates with straight cane, up and down stairs with step to pattern with rail and cane and either LE WB.        Treatment:      **= HEP  NV= Next visit  np= not performed  nb= non-billable  G= group HEP= discharged to HEP    Therapeutic Exercise: 25  Nu-step(subjective taken/education): 8 min level 3    Standing hams and hip flexor/calf stretches: 30 sec 2x each    Seated flexion stretch: with green swiss ball  10/10\"** 5x to each side 10 sec each    LTR:  x 15/5\"**   B SKTC:  10/10\"**HEP  B supine piriformis stretch:  3/30\"**    NMR:  20  T-band 3-way pull downs:  red 15x **  T-band obliques: NV  T-band B pull for/back:  red 15x **    Pelvic tilt:  10/5\" reinstruct , able to perform  Isometric adductor squeezes with a ball x 10 (combined with bridges)  Bridges:  10x /5 sec **  PT hooklying with t-band abd:  NV     Education:  Technique with new exercises, posture, body mechanics, safety awareness, HEP.  Verbal cues for correct exercise technique during exercise performance  Access Code: [email or web address]  URL: [email or web address]  Date: 03/06/2024  Prepared by: Tamara    Program Notes  Do these in bed not on the floor    Exercises  - Supine Lower Trunk Rotation  - 1 x daily - 7 x weekly - 2-3 sets - 10 reps  - Hooklying Single Knee to Chest  - 1 x daily - 7 x weekly - 1 sets - 10 reps - 10 second hold  - Supine Piriformis Stretch with Foot on Ground  - 1 x daily - 7 x weekly - 1 sets - 3 reps - 30 second hold  - Pelvic Tilt  - 1 x daily - 7 x weekly - 2-3 sets - 10 reps - 5 second hold  - Seated Lumbar Flexion Stretch  - 1 x daily - 7 x " weekly - 1 sets - 10 reps - 10 second hold  - Supine Bridge  - 1 x daily - 7 x weekly - 2 sets - 10 reps - 5 hold    -Standing tband flex/ext/3 way abdominal pull downs

## 2024-03-15 ENCOUNTER — TREATMENT (OUTPATIENT)
Dept: PHYSICAL THERAPY | Facility: CLINIC | Age: 79
End: 2024-03-15
Payer: MEDICARE

## 2024-03-15 DIAGNOSIS — M51.16 LUMBAR DISC DISEASE WITH RADICULOPATHY: ICD-10-CM

## 2024-03-15 DIAGNOSIS — J31.0 CHRONIC RHINITIS: Primary | ICD-10-CM

## 2024-03-15 PROCEDURE — 97112 NEUROMUSCULAR REEDUCATION: CPT | Mod: GP

## 2024-03-15 PROCEDURE — 97110 THERAPEUTIC EXERCISES: CPT | Mod: GP

## 2024-03-15 RX ORDER — GABAPENTIN 100 MG/1
100 CAPSULE ORAL 2 TIMES DAILY
Qty: 60 CAPSULE | Refills: 0 | Status: SHIPPED | OUTPATIENT
Start: 2024-03-15

## 2024-03-15 RX ORDER — AZELASTINE 1 MG/ML
1 SPRAY, METERED NASAL
Qty: 30 ML | Refills: 11 | Status: SHIPPED | OUTPATIENT
Start: 2024-03-15 | End: 2025-03-15

## 2024-03-15 RX ORDER — AZELASTINE 1 MG/ML
SPRAY, METERED NASAL
COMMUNITY
Start: 2019-01-14 | End: 2024-03-15 | Stop reason: SDUPTHER

## 2024-03-15 NOTE — PROGRESS NOTES
"     Physical Therapy Progress Notes    Patient Name Christelle Paul  MRN: [id number]  Today's Date: 03/15/24  Time Calculation  Start Time: 1442  Stop Time: 1528  Time Calculation (min): 46 min    Insurance:   Visit #: 5  Insurance Reviewed  (per information provided by  pre-cert team)   [facility] Certification date range:  2-28-24/5-27-24    Therapy Diagnoses:   1. Lumbar disc disease with radiculopathy  Follow Up In Physical Therapy            General:  Reason for visit: LS pain   Referred by: Margoth Gallegos MD appt:  prn after PT  Preferred Name:  Christelle  Script:  PT  Onset Date:  12-1-24  Most recent assessment/re-assessment: 2-28-24  Email/phone #:  prefers printed copy   Access Code: [email or web address]    Assessment:  Patient tolerated well, patient able to understand all the exercises and participate well.  Improvement noted with patient's transfers and gait.  Patient able to sit to stand from 16\" box without UE support.  Challenged by balance activities.  Steady gait without her assistive device.  Encourage patient to enroll in a local gym post PT.  No complaints of pain today post PT.  Improved tolerance for standing exercises today.  Updated HEP      Justification for continued skilled care: needs Skilled PT for ROM, flexibility, dynamic stabilization, strength, posture, body mechanics and gait/stairs for improved overall function.       Plan: Continue with pain relieving exercises, progress stabilization , add side stepping with t band.      Subjective:  HPI: Patient reports she is improving, not using cane at home.  Patient reports her pain level is decreasing, she is able to do more chores at home and stand longer.  patient's son present for treatment to help translate, and will try to come for appointments, but patient is able to understand English And communicate.  Patient has been compliant with HEP.  Pain  Type of pain: 5/10 LS  Patient \"crystal\" back in 12-23 and had onset of LS pain.  Patient also has " "B knee and hip OA and pain in those joints and neuropathy in B feet from Chemo from breast CA in 2012.  Patient had x-rays that showed mild OA and anterolisthesis L4-5.    What increases pain: standing/walking  What decreases pain:  sitting/just started Gabapentin    Medical Hx/  Fall Risk:  low  Steadi:  4 yes  Precautions: s/p Breast CA 2012 with surgery, HTN, OA, L ankle fracture with ORIF, see meds in chart.     Human Trafficking risk:  No    Objective:    .   Posture:  moderately for flexed posture, increased lumbar lordosis and pelvic landmarks symmetrical.      Gait/Stairs: moderately decreased trunk rotation and hip extension, wide STEPHANIE, ambulates with straight cane, up and down stairs with step to pattern with rail and cane and either LE WB.        Treatment:      **= HEP  NV= Next visit  np= not performed  nb= non-billable  G= group HEP= discharged to HEP    Therapeutic Exercise: 21  Nu-step(subjective taken/education): 8 min level 3    Standing hams and hip flexor/calf stretches: 30 sec 2x each    Seated flexion stretch: with green swiss ball  10/10\"** 5x to each side 10 sec each    LTR:  x 15/5\"**   B SKTC:  10/10\"**HEP  B supine piriformis stretch:  3/30\"  HL LTR x 10**    NMR:  25  T-band 3-way pull downs:  Green 10x **  T-band obliques: Green 15x**  T-band B pull for/back:  Green 10x **  Balance-Standing tandem without UE support x 3 reps each side**  Walking with Cable column resistance 3 pl forward x 3**  Walking with Cable column 3 pl back ascencio x 3**  Standing on the Airex, hip abduction on each with one hand support x 10**  Pelvic tilt:  10/5\" reinstruct , able to perform  Isometric adductor squeezes with a ball x 10 (combined with bridges)  Bridges:  10x /5 sec   PT hooklying with t-band abd:  with BTB x 10 x 2**    Education:  Technique with new exercises, posture, body mechanics, safety awareness, HEP.  Verbal cues for correct exercise technique during exercise performance  Access Code: " [email or web address]  URL: [email or web address]  Date: 03/06/2024  Prepared by: Tamara    Program Notes  Do these in bed not on the floor    Exercises  - Supine Lower Trunk Rotation  - 1 x daily - 7 x weekly - 2-3 sets - 10 reps  - Hooklying Single Knee to Chest  - 1 x daily - 7 x weekly - 1 sets - 10 reps - 10 second hold  - Supine Piriformis Stretch with Foot on Ground  - 1 x daily - 7 x weekly - 1 sets - 3 reps - 30 second hold  - Pelvic Tilt  - 1 x daily - 7 x weekly - 2-3 sets - 10 reps - 5 second hold  - Seated Lumbar Flexion Stretch  - 1 x daily - 7 x weekly - 1 sets - 10 reps - 10 second hold  - Supine Bridge  - 1 x daily - 7 x weekly - 2 sets - 10 reps - 5 hold    -Standing tband flex/ext/3 way abdominal pull downs

## 2024-03-20 ENCOUNTER — TREATMENT (OUTPATIENT)
Dept: PHYSICAL THERAPY | Facility: CLINIC | Age: 79
End: 2024-03-20
Payer: MEDICARE

## 2024-03-20 DIAGNOSIS — M51.16 LUMBAR DISC DISEASE WITH RADICULOPATHY: ICD-10-CM

## 2024-03-20 PROCEDURE — 97112 NEUROMUSCULAR REEDUCATION: CPT | Mod: GP,CQ

## 2024-03-20 PROCEDURE — 97110 THERAPEUTIC EXERCISES: CPT | Mod: GP,CQ

## 2024-03-20 NOTE — PROGRESS NOTES
"     Physical Therapy Progress Notes    Patient Name Christelle Paul  MRN: [id number]  Today's Date: 03/20/24  Time Calculation  Start Time: 0830  Stop Time: 0915  Time Calculation (min): 45 min    Insurance:   Visit #: 5  Insurance Reviewed  (per information provided by  pre-cert team)   [facility] Certification date range:  2-28-24/5-27-24    Therapy Diagnoses:   1. Lumbar disc disease with radiculopathy  Follow Up In Physical Therapy            General:  Reason for visit: LS pain   Referred by: Margoth Gallegos MD appt:  prn after PT  Preferred Name:  Christelle  Script:  PT  Onset Date:  12-1-24  Most recent assessment/re-assessment: 2-28-24  Email/phone #:  prefers printed copy   Access Code: [email or web address]    Assessment:  Patient tolerated well, patient able to understand all the exercises and participate well. Did report pain decreased to 5 post rx     Justification for continued skilled care: needs Skilled PT for ROM, flexibility, dynamic stabilization, strength, posture, body mechanics and gait/stairs for improved overall function.       Functional progress: Increased walking at home ( though advised to pace and not increase too much at a time)    Plan: Continue with pain relieving exercises, progress stabilization.      Subjective:  Arrives today with pain at a 6, uncomfortable .Son reports she was doing good but went to a couple of stores and was walking around and thinks may have been too much.    Patient's son present for treatment to help translate, and will try to come for appointments, but patient is able to understand English And communicate.  Patient has been compliant with HEP.    Pain  Type of pain: 6/10 LS  Patient \"crystal\" back in 12-23 and had onset of LS pain.  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  Patient had x-rays that showed mild OA and anterolisthesis L4-5.    What increases pain: standing/walking  What decreases " "pain:  sitting/just started Gabapentin    Medical Hx/  Fall Risk:  low  Steadi:  4 yes  Precautions: s/p Breast CA 2012 with surgery, HTN, OA, L ankle fracture with ORIF, see meds in chart.     Human Trafficking risk:  No    Objective:    .   Posture:  moderately for flexed posture, increased lumbar lordosis and pelvic landmarks symmetrical.      Gait/Stairs: moderately decreased trunk rotation and hip extension, wide STEPHANIE, ambulates with straight cane, up and down stairs with step to pattern with rail and cane and either LE WB.        Treatment:      **= HEP  NV= Next visit  np= not performed  nb= non-billable  G= group HEP= discharged to HEP    Therapeutic Exercise: 20  Nu-step(subjective taken/education): 8 min level 3    Standing hams and hip flexor/calf stretches: 30 sec 2x each    Seated flexion stretch: with green swiss ball  10/10\"** 5x to each side 10 sec each    LTR:  x 15/5\"** HEP  B SKTC:  10/10\"**HEP  B supine piriformis stretch:  3/30\"      NMR:  25  T-band 3-way pull downs:  Green 10x **  T-band obliques: Green 15x**  T-band B pull for/back:  Green 15x **  Balance-Standing tandem without UE support x 3 reps each side**  Walking with Cable column resistance 3 pl forward x 3**np  Walking with Cable column 3 pl back ascencio x 3**np  Standing on the Airex, hip abduction/hip extension  on each with one hand support x 10**  Side stepping with YTB 2x along brandon bar  Pelvic tilt:  10/5\" reinstruct , able to perform  Isometric adductor squeezes with a ball x 10  Bridges:  10x /5 sec   PT hooklying with t-band abd:  with BTB x 10 x 2**    Education:  Technique with new exercises, posture, body mechanics, safety awareness, HEP.  Verbal cues for correct exercise technique during exercise performance  Access Code: [email or web address]  URL: [email or web address]  Date: 03/06/2024  Prepared by: Tamara    Program Notes  Do these in bed not on the floor    Exercises  - Supine Lower Trunk Rotation  - 1 x daily - " 7 x weekly - 2-3 sets - 10 reps  - Hooklying Single Knee to Chest  - 1 x daily - 7 x weekly - 1 sets - 10 reps - 10 second hold  - Supine Piriformis Stretch with Foot on Ground  - 1 x daily - 7 x weekly - 1 sets - 3 reps - 30 second hold  - Pelvic Tilt  - 1 x daily - 7 x weekly - 2-3 sets - 10 reps - 5 second hold  - Seated Lumbar Flexion Stretch  - 1 x daily - 7 x weekly - 1 sets - 10 reps - 10 second hold  - Supine Bridge  - 1 x daily - 7 x weekly - 2 sets - 10 reps - 5 hold    -Standing tband flex/ext/3 way abdominal pull downs

## 2024-03-22 ENCOUNTER — TREATMENT (OUTPATIENT)
Dept: PHYSICAL THERAPY | Facility: CLINIC | Age: 79
End: 2024-03-22
Payer: MEDICARE

## 2024-03-22 DIAGNOSIS — M51.16 LUMBAR DISC DISEASE WITH RADICULOPATHY: ICD-10-CM

## 2024-03-22 PROCEDURE — 97112 NEUROMUSCULAR REEDUCATION: CPT | Mod: GP | Performed by: PHYSICAL THERAPIST

## 2024-03-22 PROCEDURE — 97110 THERAPEUTIC EXERCISES: CPT | Mod: GP | Performed by: PHYSICAL THERAPIST

## 2024-03-22 NOTE — PROGRESS NOTES
Physical Therapy  Physical Therapy Progress Note    Patient Name Christelle Paul   MRN: [id number]  Today's Date: 03/22/24  Time Calculation  Start Time: 0250  Stop Time: 0328  Time Calculation (min): 38 min    Insurance:    Visit #: 7  Insurance Reviewed  (per information provided by  pre-cert team)   [facility] Certification date range:  2-28-24/5-27-24    Therapy Diagnoses:   1. Lumbar disc disease with radiculopathy  Follow Up In Physical Therapy        General:  Reason for visit: LS pain   Referred by: Margoth Gallegos MD appt:  prn after PT  Preferred Name:  Christelle  Script:  PT  Onset Date:  12-1-24  Most recent assessment/re-assessment: 2-28-24  Email/phone #:  prefers printed copy   Access Code: [email or web address]    Assessment:  Patient tolerated treatment well, did well with progression this date.    Patient needs continued work on/skilled PT for: core stability and functional strength and balance to address remaining functional, objective and subjective deficits to allow them to return to prior /optimal level of function with ADLs.  Patient is progressing with goals: compliance with HEP.   Skilled care:  exercise progression and guarding    Plan:    Continue to progress per poc:   NV add progress work on balance and core stability.     Subjective:   Patient reports:  that she is better overall, but some increased pain with more walking    Have you fallen since last visit:  no    Precautions:   low fall risk  s/p Breast CA 2012 with surgery, HTN, OA, L ankle fracture with ORIF, see meds in chart.     Pain:  5/10  Location/Type of pain:  aching LS and R hip    HEP compliance/understanding:  yes    Objective:   Objective Measurements:    Function:   pain increase with standing and walking.   Posture: discussed increased awareness of proper posture.     Treatment:   **= HEP  NV= Next visit  np= not performed  nb= non-billable  G= group HEP= discharged to HEP  Therapeutic Exercise:     23 minutes  Nu-step(subjective  "taken/education): 10 min level 3     Standing hams and hip flexor/calf stretches: 30 sec 2x each  Seated trunk flexion stretch with Green Swiss ball:  x0\" x 5 ea.     Neuromuscular Re-education:    15 minutes  Airex balance beam side stepping with SBA x 1 x 3 laps  Airex marching/alt hip abd/ext:  light B UE support x 15 ea.   For heel/to walk and retro walk: NV  Airex Partial squats:  x 10 cues for form    Education:  exercise progression    "

## 2024-03-27 ENCOUNTER — TREATMENT (OUTPATIENT)
Dept: PHYSICAL THERAPY | Facility: CLINIC | Age: 79
End: 2024-03-27
Payer: MEDICARE

## 2024-03-27 DIAGNOSIS — M51.16 LUMBAR DISC DISEASE WITH RADICULOPATHY: ICD-10-CM

## 2024-03-27 PROCEDURE — 97110 THERAPEUTIC EXERCISES: CPT | Mod: GP,CQ

## 2024-03-27 PROCEDURE — 97112 NEUROMUSCULAR REEDUCATION: CPT | Mod: GP,CQ

## 2024-03-27 NOTE — PROGRESS NOTES
Physical Therapy  Physical Therapy Progress Note    Patient Name Christelle Paul   MRN: [id number]  Today's Date: 03/27/24  Time Calculation  Start Time: 0830  Stop Time: 0915  Time Calculation (min): 45 min    Insurance:    Visit #: 7  Insurance Reviewed  (per information provided by  pre-cert team)   [facility] Certification date range:  2-28-24/5-27-24    Therapy Diagnoses:   1. Lumbar disc disease with radiculopathy  Follow Up In Physical Therapy        General:  Reason for visit: LS pain   Referred by: Margoth Gallegos MD appt:  prn after PT  Preferred Name:  Christelle  Script:  PT  Onset Date:  12-1-24  Most recent assessment/re-assessment: 2-28-24  Email/phone #:  prefers printed copy   Access Code: [email or web address]    Assessment:  Patient tolerated treatment well, did well with progression this date. Reported being pain free upona completion of stretches and maintained pain free status entire session.   Patient needs continued work on/skilled PT for: core stability and functional strength and balance to address remaining functional, objective and subjective deficits to allow them to return to prior /optimal level of function with ADLs.  Patient is progressing with goals: compliance with HEP/ inc strength  Skilled care:  exercise progression and guarding    Plan:    Continue to progress per poc:   NV continue to progress balance and core stability.     Subjective:   Patient reports:  that she is better overall, and is doing more at home .    Have you fallen since last visit:  no    Precautions:   low fall risk  s/p Breast CA 2012 with surgery, HTN, OA, L ankle fracture with ORIF, see meds in chart.     Pain:  5/10  Location/Type of pain:  aching LS and R hip    HEP compliance/understanding:  yes    Objective:   Objective Measurements:    Function:   performed standing exercises pain free today   Posture: discussed increased awareness of proper posture. / improved posture with standing exercises today    Treatment:  "  **= HEP  NV= Next visit  np= not performed  nb= non-billable  G= group HEP= discharged to HEP  Therapeutic Exercise:     20 minutes  Nu-step(subjective taken/education): 10 min level 3   LTR:  x 15/5\"** HEP  B SKTC:  10/10\"**HEP  B supine piriformis stretch:  3/30\"        Standing hams and hip flexor/calf stretches: 30 sec 2x each  Seated trunk flexion stretch with Green Swiss ball:  x10\" x 5 ea.     Neuromuscular Re-education:    25 Minutes    Airex balance beam side stepping with SBA x 1 x 3 laps  Airex marching/alt hip abd/ext:  light B UE support x 15 ea.   For heel/to walk and retro walk: 3x along brandon bar with UNI UE support   Airex Partial squats: 2 x 10 cues for form  Tband flex/ext green 2 x 10**  Tband obliques green 10x each**  Clamshells 2 x 10 in L sidely    Education:  exercise progression  "

## 2024-03-29 ENCOUNTER — TREATMENT (OUTPATIENT)
Dept: PHYSICAL THERAPY | Facility: CLINIC | Age: 79
End: 2024-03-29
Payer: MEDICARE

## 2024-03-29 DIAGNOSIS — M51.16 LUMBAR DISC DISEASE WITH RADICULOPATHY: ICD-10-CM

## 2024-03-29 PROCEDURE — 97112 NEUROMUSCULAR REEDUCATION: CPT | Mod: GP | Performed by: PHYSICAL THERAPIST

## 2024-03-29 PROCEDURE — 97110 THERAPEUTIC EXERCISES: CPT | Mod: GP | Performed by: PHYSICAL THERAPIST

## 2024-03-29 NOTE — PROGRESS NOTES
Physical Therapy  Physical Therapy Progress Note    Patient Name Christelle Paul   MRN: [id number]  Today's Date: 03/29/24  Time Calculation  Start Time: 0245  Stop Time: 0325  Time Calculation (min): 40 min    Insurance:    Visit #: 9  Insurance Reviewed  (per information provided by  pre-cert team)   [facility] Certification date range:  2-28-24/5-27-24  Therapy Diagnoses:   Problem List Items Addressed This Visit             ICD-10-CM    Lumbar disc disease with radiculopathy M51.16     General:  Reason for visit: LS pain   Referred by: Margoth Gallegos MD appt:  prn after PT  Preferred Name:  Christelle  Script:  PT  Onset Date:  12-1-24  Most recent assessment/re-assessment: 2-28-24  Email/phone #:  prefers printed copy   Access Code: [email or web address]    Assessment:  Patient tolerated treatment well, did well with progression this date.    Patient needs continued work on/skilled PT for: core stability to address remaining functional, objective and subjective deficits to allow them to return to prior /optimal level of function with ADLs.  Patient is progressing with goals: decreased radicular symptoms and decreased pain to 4/10 during session from 5/10  Skilled care:  exercise progression and guarding.     Plan:    Continue to progress per poc:   NV add progression of balance activities and higher cones.      Subjective:   Patient reports:  that she is still having LS pain, but not LE symptoms.     Have you fallen since last visit:  no    Precautions:  low fall risk  s/p Breast CA 2012 with surgery, HTN, OA, L ankle fracture with ORIF, see meds in chart.     Pain:  5/10  Location/Type of pain:  LS    HEP compliance/understanding:  yes    Objective:   Objective Measurements:    Function:   no longer reporting LE symptoms  Posture: more upright posture noted.   Gait: good gait pattern with straight cane    Treatment:   **= HEP  NV= Next visit  np= not performed  nb= non-billable  G= group HEP= discharged to [facility]  Therapeutic  "Exercise:     18 minutes  Nu-step(subjective taken/education): 10 min level 3   Standing hams and hip flexor/calf stretches: 30 sec 2x each  Seated trunk flexion stretch with Green Swiss ball:  x10\" x 5 ea.     Neuromuscular Re-education:    22 minutes  Partial squats:  2 x 10 cues for form  For/lat step ups:  6\"/ 2 x 10 B with B UE support  Tband obliques red 15 x each**   Airex balance beam side steppin and heel toe walk for:  x 3 laps ea; B UE support for side stepping and one for heel toe with SBA x 1 for both  Airex side stepping with small cone step overs:  x 3 laps B UE support with SBA x 1    Education:  exercise progression    "

## 2024-04-03 ENCOUNTER — TREATMENT (OUTPATIENT)
Dept: PHYSICAL THERAPY | Facility: CLINIC | Age: 79
End: 2024-04-03
Payer: MEDICARE

## 2024-04-03 DIAGNOSIS — M51.16 LUMBAR DISC DISEASE WITH RADICULOPATHY: ICD-10-CM

## 2024-04-03 PROCEDURE — 97014 ELECTRIC STIMULATION THERAPY: CPT | Mod: GP | Performed by: PHYSICAL THERAPIST

## 2024-04-03 PROCEDURE — 97110 THERAPEUTIC EXERCISES: CPT | Mod: GP | Performed by: PHYSICAL THERAPIST

## 2024-04-03 NOTE — PROGRESS NOTES
Physical Therapy  Physical Therapy Progress Note    Patient Name Christelle Paul   MRN: [id number]  Today's Date: 04/03/24  Time Calculation  Start Time: 0915  Stop Time: 1015  Time Calculation (min): 60 min    Insurance:    Visit #: 10  Insurance Reviewed  (per information provided by  pre-cert team)   [facility] Certification date range:  2-28-24/5-27-24    Therapy Diagnoses:   Problem List Items Addressed This Visit             ICD-10-CM    Lumbar disc disease with radiculopathy M51.16     General:  Reason for visit: LS pain   Referred by: Margoth Gallegos MD appt:  prn after PT  Preferred Name:  Christelle  Script:  PT  Onset Date:  12-1-24  Most recent assessment/re-assessment: 2-28-24  Email/phone #:  prefers printed copy   Access Code: [email or web address]    Assessment:  Patient tolerated treatment well, did well with progression this date.    Patient needs continued work on/skilled PT for: core stability to address remaining functional, objective and subjective deficits to allow them to return to prior /optimal level of function with ADLs.  Patient is progressing with goals: increased tolerance to standing and walking.   Skilled care:  exercise progression and modalities.     Plan:    Continue to progress per poc:   NV re-check and monitor response to IFC    Subjective:   Patient reports:  that the LS and B hip soreness are a little flared up today.      Have you fallen since last visit:  no    Precautions:   low fall risk  s/p Breast CA 2012 with surgery, HTN, OA, L ankle fracture with ORIF, see meds in chart.     Pain:  5/10  Location/Type of pain:  LS and B lateral hips dull, worse standing and walking, better sitting.     HEP compliance/understanding:  yes    Objective:   Objective Measurements:    Function:   able to stand and walk some at times without pain.   Posture:  increased awareness of core stability  Gait: some improvements noted in hip extension and trunk rotation.     Treatment:   **= HEP  NV= Next visit   "np= not performed  nb= non-billable  G= group HEP= discharged to HEP  Therapeutic Exercise:     38 minutes  Nu-step(subjective taken/education): 10 min level 3   Standing hams and hip flexor/calf stretches: 30 sec 2x each  Red Swiss ball LTR: x 20  Red Swiss ball BKTC:  x 20  Red Swiss ball seated flexion stretch x 3 directions:  5/10\" ea.     Neuromuscular Re-education:    2 minutes  Red Latvian ball bridge:  2 x 10    Modalities:       Electrical Stimulation        15 minutes  IFC/CP to B LS x 15'//40%/ Intensity:  sensory stim supine with bolster.      Education:  exercise progression and theory of IFC.      "

## 2024-04-05 ENCOUNTER — APPOINTMENT (OUTPATIENT)
Dept: PHYSICAL THERAPY | Facility: CLINIC | Age: 79
End: 2024-04-05
Payer: MEDICARE

## 2024-04-05 ENCOUNTER — DOCUMENTATION (OUTPATIENT)
Dept: PHYSICAL THERAPY | Facility: CLINIC | Age: 79
End: 2024-04-05
Payer: COMMERCIAL

## 2024-04-05 NOTE — PROGRESS NOTES
Physical Therapy                 Therapy Communication Note    Patient Name: Chirstelle Paul  MRN: [id number]  Today's Date: 4/5/2024     Discipline: Physical Therapy    Missed Visit Reason:  no reason given    Missed Time: Cancel

## 2024-04-10 ENCOUNTER — APPOINTMENT (OUTPATIENT)
Dept: PHYSICAL THERAPY | Facility: CLINIC | Age: 79
End: 2024-04-10
Payer: MEDICARE

## 2024-04-24 ENCOUNTER — TREATMENT (OUTPATIENT)
Dept: PHYSICAL THERAPY | Facility: CLINIC | Age: 79
End: 2024-04-24
Payer: MEDICARE

## 2024-04-24 DIAGNOSIS — M51.16 LUMBAR DISC DISEASE WITH RADICULOPATHY: ICD-10-CM

## 2024-04-24 PROCEDURE — 97014 ELECTRIC STIMULATION THERAPY: CPT | Mod: GP | Performed by: PHYSICAL THERAPIST

## 2024-04-24 PROCEDURE — 97110 THERAPEUTIC EXERCISES: CPT | Mod: GP | Performed by: PHYSICAL THERAPIST

## 2024-04-24 NOTE — PROGRESS NOTES
Physical Therapy  Physical Therapy Progress Note    Patient Name Christelle Paul   MRN: [id number]  Today's Date: 24  Time Calculation  Start Time: 920  Stop Time: 1020  Time Calculation (min): 60 min    Insurance:    Visit #: 10  Insurance Reviewed  (per information provided by  pre-cert team)   [facility] Certification date range:  24/24  Therapy Diagnoses:   Problem List Items Addressed This Visit             ICD-10-CM    Lumbar disc disease with radiculopathy M51.16    Relevant Orders    Follow Up In Physical Therapy     General:  Reason for visit: LS pain   Referred by: Margoth Gallegos MD appt:  prn after PT  Preferred Name:  Christelle  Script:  PT  Onset Date:  24  Most recent assessment/re-assessment: 24  Email/phone #:  prefers printed copy   Access Code: [email or web address]    Assessment:  Skilled care:  re-assessment  STG:    -Increased postural awareness  -Compliant with HEP.   LTG:  by discharge  -Increased postural awareness and posture WFL.  - pain to:  2/10 and patient I with self management of symptoms.   -Decreased pain and increased function with ADL's and IADL's.  Improve Oswestry  to: 20%  limitation of function.  -Normal gait on level and uneven surfaces community level distances for improved function in the community with least assistive device.  -Normal reciprocal pattern on stairs for improved function to all levels of home with rail.    -TUG to within norms for age for decreased risk for falls.  -Increase trunk AROM to WFL for improved function with dressing and driving.    -Increase trunk strength and stability and R/L LE strength to WFL for improved function with chores.  -Increase B LE flexibility to WFL.    -Decrease B /lower quarter tenderness 50-75% per patient report.  -I and compliant with HEP and proper: LE/lower back care.     Plan:    Continue to progress per poc:   NV resume prior program, patient's son to look into home stim unit, given info re: TENS unit  "on [employer] and also discussed [employer] home unit.     Subjective:   Patient reports:  that she is not longer having LS pain, some L hip soreness today likely due to mild OA there.  Patient was sick the last few weeks and unable to do HEP and is having some L LE tightness.  Good relief with IFC last visit.      Have you fallen since last visit:  no    Precautions:   low fall risk  s/p Breast CA 2012 with surgery, HTN, OA, L ankle fracture with ORIF, see meds in chart.    Pain:  0/10 LS. L hip joint:  3/10  Location/Type of pain:  L hip joint aching    HEP compliance/understanding:  stopped due to not feeling well, advised to resume with goal of 3-5 days/week.     Objective:   Objective Measurements:  limitations due to L hip now not LS.  Sleep:  WFL/ instructed in proper postures.  ADL's:  using chair to shower and has been for a while, sits to dress.   Chores:  less pain with chores requiring standing.  Likes to garden.   Driving:  WNL  Work: retired  Recreation: out to shop and go to [employer], out with family, not going to [employer] currently due to difficulty with standing.   Sitting:  unlimited Standin'(was 20' )  Walkin'(was 20' with cane)     Objective:    Outcome Measures:  Other Measures  Oswestry Disablity Index (PAULIE): 30%(was 38%) limitation of function.      Posture:  moderately for flexed posture, increased lumbar lordosis and pelvic landmarks symmetrical.    4-24-24:  Increased awareness and some improvements not.      Gait/Stairs: moderately decreased trunk rotation and hip extension, wide STEPHANIE, ambulates with straight cane, up and down stairs with step to pattern with rail and cane and either LE WB.    -24-24:  Improvements noted with trunk rotation and hip extension and at times does stairs reciprocally now.       TU.2\" straight cane and B UE on arm rests.   normals:  60-69 years of age (7.1 - 9.0 seconds)  70 - 79 years of age: (8.2 - 10.2 seconds)**  80 - 99 years of age (10 - 12.7 " "seconds)     Palpation:  min (was moderate) tenderness B lumbar paraspinals and piriformis.  L > R distal LE pitting edema, per son and patient, MD is aware.       ROM:  Trunk Flexion: 7\"(was 10\") 3rd to floor  Extension: 0/18(was 0/12) pain  RSB:  WFL (was ff component)  LSB:  WFL (was ff component)  RR: 60%(was 40%)  LR: 60%(was 40%)     MMT:  Abd: 3/5(was 1+/5)  Bridge: 75%(was 10%)  B LE myotomes:  WNL and symmetrical     Flexibility:  Hamstrings:  90/90:  R:  -22(was -28)  L: -20(was -28)  Heelcords:   8(was 0) B  Hip flexors: min/mod (was mod) B  Gluts: min(was mod) B  Piriformis: minwas mod) B    Treatment:   **= HEP  NV= Next visit  np= not performed  nb= non-billable  G= group HEP= discharged to HEP  Therapeutic Exercise:     40 minutes  Nu-step soft tissue warm up and subjective taken:  Lev 3 x 10'  Re-assessment      Modalities:         Electrical Stimulation        15 minutes  IFC/CP to L LS/buttock x 15'//40%/ Intensity: sensory stim supine with bolster.     Education:  poc, home stim unit options.  "

## 2024-05-01 ENCOUNTER — TREATMENT (OUTPATIENT)
Dept: PHYSICAL THERAPY | Facility: CLINIC | Age: 79
End: 2024-05-01
Payer: MEDICARE

## 2024-05-01 DIAGNOSIS — M51.16 LUMBAR DISC DISEASE WITH RADICULOPATHY: ICD-10-CM

## 2024-05-01 PROCEDURE — 97110 THERAPEUTIC EXERCISES: CPT | Mod: GP,CQ

## 2024-05-01 PROCEDURE — 97112 NEUROMUSCULAR REEDUCATION: CPT | Mod: GP,CQ

## 2024-05-01 PROCEDURE — 97014 ELECTRIC STIMULATION THERAPY: CPT | Mod: GP,CQ

## 2024-05-01 NOTE — PROGRESS NOTES
Physical Therapy  Physical Therapy Progress Note    Patient Name Christelle Paul   MRN: [id number]  Today's Date: 05/01/24  Time Calculation  Start Time: 1245  Stop Time: 1340  Time Calculation (min): 55 min    Insurance:    Visit #: 12  Insurance Reviewed  (per information provided by  pre-cert team)   [facility] Certification date range:  2-28-24/5-27-24    Therapy Diagnoses:   Problem List Items Addressed This Visit             ICD-10-CM    Lumbar disc disease with radiculopathy M51.16     General:  Reason for visit: LS pain   Referred by: Margoth Gallegos MD appt:  prn after PT  Preferred Name:  Christelle  Script:  PT  Onset Date:  12-1-24  Most recent assessment/re-assessment: 2-28-24  Email/phone #:  prefers printed copy   Access Code: [email or web address]    Assessment:  Patient tolerated treatment well, able to resume swiss ball  and LE exercises. Reported some LE fatigue post exercise though no increase in pain.  Patient needs continued work on/skilled PT for: core stability to address remaining functional, objective and subjective deficits to allow them to return to prior /optimal level of function with ADLs.  Patient is progressing with goals: increased tolerance to standing and walking.   Skilled care:  exercise progression and modalities.     Plan:    Continue to progress per poc:   NV resume standing tband / balance exercises per pt. tolerance    Subjective:   Patient reports:  a little less pain today, son has obtained home TENS unit but they have not tried it yet  Per son pt. Able to stand/walk for up to 30 min now.    Have you fallen since last visit:  no    Precautions:   low fall risk  s/p Breast CA 2012 with surgery, HTN, OA, L ankle fracture with ORIF, see meds in chart.     Pain:  3/10  Location/Type of pain:  LS and B lateral hips dull, worse standing and walking, better sitting.     HEP compliance/understanding:  yes    Objective:   Objective Measurements:    Function:   able to stand and walk  for about  "30 minutes without pain.          Treatment:   **= HEP  NV= Next visit  np= not performed  nb= non-billable  G= group HEP= discharged to HEP  Therapeutic Exercise:     25 minutes  Nu-step(subjective taken/education): 10 min level 3   Standing hams and hip flexor/calf stretches: 30 sec 2x each  Slant board stretch 30 sec 3x with tactile cues  Red Tongan ball LTR: x 20  Red Swiss ball BKTC:  x 20  Red Swiss ball seated flexion stretch x 3 directions:  5/10\" ea.     Neuromuscular Re-education:    15 minutes  Red Tongan ball bridge:  2 x 10  Partial squats with UE support with cues for technique  x 10  F/L 6\" step with light UE support 10x each     Modalities:       Electrical Stimulation        15 minutes  IFC/CP to B LS x 15'//40%/ Intensity:  sensory stim supine with bolster.      Education:  exercise progression    "

## 2024-05-08 ENCOUNTER — TREATMENT (OUTPATIENT)
Dept: PHYSICAL THERAPY | Facility: CLINIC | Age: 79
End: 2024-05-08
Payer: MEDICARE

## 2024-05-08 DIAGNOSIS — M51.16 LUMBAR DISC DISEASE WITH RADICULOPATHY: ICD-10-CM

## 2024-05-08 PROCEDURE — 97014 ELECTRIC STIMULATION THERAPY: CPT | Mod: GP | Performed by: PHYSICAL THERAPIST

## 2024-05-08 PROCEDURE — 97112 NEUROMUSCULAR REEDUCATION: CPT | Mod: GP | Performed by: PHYSICAL THERAPIST

## 2024-05-08 PROCEDURE — 97110 THERAPEUTIC EXERCISES: CPT | Mod: GP | Performed by: PHYSICAL THERAPIST

## 2024-05-08 NOTE — PROGRESS NOTES
Physical Therapy  Physical Therapy Progress Note    Patient Name Christelle Paul   MRN: [id number]  Today's Date: 05/08/24  Time Calculation  Start Time: 0300  Stop Time: 0355  Time Calculation (min): 55 min    Insurance:    Visit #:   13   Insurance Reviewed  (per information provided by  pre-cert team)   [facility] Certification date range:  2-28-24/5-27-24    Therapy Diagnoses:   Problem List Items Addressed This Visit             ICD-10-CM    Lumbar disc disease with radiculopathy M51.16       General:  Reason for visit: LS pain   Referred by: Margoth Gallegos MD appt:  prn after PT  Preferred Name:  Christelle  Script:  PT  Onset Date:  12-1-24  Most recent assessment/re-assessment: 2-28-24  Email/phone #:  prefers printed copy   Access Code: [email or web address]    Assessment:  Patient tolerated treatment well, did well with progression this date.    Patient needs continued work on/skilled PT for: core stability and balance to address remaining functional, objective and subjective deficits to allow them to return to prior /optimal level of function with ADLs.  Patient is progressing with goals: improved tolerance to walking and standing  Skilled care:  exercise progression     Plan:    Continue to progress per poc:   NV add progression of balance and core stability program and insure I with HEP.  Re-check in 2 weeks.     Subjective:   Patient reports:  that she is feeling better sometimes with standing and walking.      Have you fallen since last visit:  no    Precautions:  low fall risk  s/p Breast CA 2012 with surgery, HTN, OA, L ankle fracture with ORIF, see meds in chart.     Pain:  3/10  Location/Type of pain:  L LS and L lateral hip    HEP compliance/understanding:  partially    Objective:   Objective Measurements:    Function:   less pain overall with standing and walking  Posture: more upright posture noted.       Treatment:   **= HEP  NV= Next visit  np= not performed  nb= non-billable  G= group HEP= discharged to  "HEP  Therapeutic Exercise:     18 minutes  Nu-step(subjective taken/education): 10 min level 3   Standing hams and hip flexor/calf stretches: 30 sec 2x each  Seated Flexion stretch with Red Swiss ball x 3 directions:  5/10\" ea.     Neuromuscular Re-education:    20 minutes  Sit to stand from 20\" box without UE support:  2 x 10  Airex partial squats:  B UE support:  x 15  Airex marching/alt hip ext/add: B UE support: x 15 ea.   Bridges with add set:  2 x 10    Modalities:       Electrical Stimulation        15 minutes  IFC/CP to BLLS /buttock x 15'//40%/ Intensity:  sensory stim supine with bolster.       Education:  exercise progression    "

## 2024-05-15 ENCOUNTER — APPOINTMENT (OUTPATIENT)
Dept: PHYSICAL THERAPY | Facility: CLINIC | Age: 79
End: 2024-05-15
Payer: MEDICARE

## 2024-05-22 ENCOUNTER — APPOINTMENT (OUTPATIENT)
Dept: PHYSICAL THERAPY | Facility: CLINIC | Age: 79
End: 2024-05-22
Payer: MEDICARE

## 2024-05-29 ENCOUNTER — DOCUMENTATION (OUTPATIENT)
Dept: PHYSICAL THERAPY | Facility: CLINIC | Age: 79
End: 2024-05-29
Payer: COMMERCIAL

## 2024-05-29 NOTE — PROGRESS NOTES
Physical Therapy    Discharge Summary    Name: Christelle Paul  MRN: [id number]  : 1945  Date: 24    Discharge Summary: PT    Discharge Information: Date of last visit 24    Therapy Summary: patient cancelled remaining scheduled appointments and is currently being discharged.    Discharge Status: see 24 re-assessment     Rehab Discharge Reason: Other see above

## 2024-10-02 ENCOUNTER — PATIENT OUTREACH (OUTPATIENT)
Dept: PRIMARY CARE | Facility: CLINIC | Age: 79
End: 2024-10-02
Payer: COMMERCIAL

## 2024-10-02 NOTE — PROGRESS NOTES
Son called back and stated patient received a call from hospital for patient to come back as she needs to have IV antibiotics. Patient rehospitalized at [facility].

## 2024-10-04 ENCOUNTER — PATIENT OUTREACH (OUTPATIENT)
Dept: PRIMARY CARE | Facility: CLINIC | Age: 79
End: 2024-10-04
Payer: COMMERCIAL

## 2024-10-04 NOTE — PROGRESS NOTES
Discharge facility: [facility]  Discharge diagnosis: E. coli UTI with E. coli sepsis   Admission date: 10/1/24  Discharge date: 10/3/24    PCP Appointment Date: No available appointments within 14 days/ message to office   Specialist Appointment Date: 1/8/25 onc  Hospital Encounter and Summary: Linked    See Discharge assessment below for further details    Medications  Medications reviewed with patient/caregiver?: Yes (Spoke with patient's son) (10/4/2024  2:40 PM)  Is the patient having any side effects they believe may be caused by any medication additions or changes?: No (10/4/2024  2:40 PM)  Does the patient have all medications ordered at discharge?: Yes (10/4/2024  2:40 PM)  Care Management Interventions: Provided patient education (10/4/2024  2:40 PM)  Prescription Comments: sulfamethoxazole-trimethoprim 400 mg-80 mg oral tablet = Bactrim, Septra  1 tabs, ORAL, [id number], # 20 tabs, Refill(s) 0, Date: 10/3/24 12:00:00 PM EDT, Pharmacy: [facility]/pharmacy #[id number], 1 tabs ORAL [id number],x10 days, (10/4/2024  2:40 PM)  Is the patient taking all medications as directed (includes completed medication regime)?: Yes (10/4/2024  2:40 PM)  Care Management Interventions: Provided patient education (10/4/2024  2:40 PM)  Medication Comments: Instructed on new medication of sulfamethoxazole-trimethoprim 400 mg-80 mg oral tablet = Bactrim, Septra  1 tabs, ORAL, [id number], (10/4/2024  2:40 PM)  Follow Up Tasks: -- (n/a) (10/4/2024  2:40 PM)    Appointments  Does the patient have a primary care provider?: Yes (10/4/2024  2:40 PM)  Care Management Interventions: Educated patient on importance of making appointment (message to office) (10/4/2024  2:40 PM)  Has the patient kept scheduled appointments due by today?: Yes (10/4/2024  2:40 PM)  Care Management Interventions: Advised patient to keep appointment (10/4/2024  2:40 PM)  Follow Up Tasks: -- (n/a) (10/4/2024  2:40 PM)    Self Management  What is the home health  agency?: n/a-Declines need (10/4/2024  2:40 PM)  Has home health visited the patient within 72 hours of discharge?: Not applicable (10/4/2024  2:40 PM)  Home Health Interventions: -- (n/a) (10/4/2024  2:40 PM)  What Durable Medical Equipment (DME) was ordered?: n/a (10/4/2024  2:40 PM)  Has all Durable Medical Equipment (DME) been delivered?: -- (n/a) (10/4/2024  2:40 PM)  DME Interventions: -- (n/a) (10/4/2024  2:40 PM)  Care Management Interventions: Notified PCP/provider (10/4/2024  2:40 PM)  Follow Up Tasks: -- (n/a) (10/4/2024  2:40 PM)    Patient Teaching  Does the patient have access to their discharge instructions?: Yes (10/4/2024  2:40 PM)  Care Management Interventions: Reviewed instructions with patient (With patient's son) (10/4/2024  2:40 PM)  What is the patient's perception of their health status since discharge?: Improving (10/4/2024  2:40 PM)  Is the patient/caregiver able to teach back the hierarchy of who to call/visit for symptoms/problems? PCP, Specialist, Home Health nurse, Urgent Care, ED, 911: Yes (10/4/2024  2:40 PM)  Patient/Caregiver Education Comments: Instructed on hospital discharge instructions. Instructed on new and changed medications. Instructed if increased shortness of breath or chest pains to call 911. Provided my contact information and encouraged to call with any questions (10/4/2024  2:40 PM)    Wrap Up  Is the patient/caregiver familiar with Advance Care Planning?: Yes (10/4/2024  2:40 PM)  Would the patient like more information on Advance Care Planning?: No (10/4/2024  2:40 PM)  Wrap Up Additional Comments: Spoke with patient's son. He reports she is doing alright. He reports he obtained new antibiotic and patient is taking it with no problems. (10/4/2024  2:40 PM)

## 2024-10-07 ENCOUNTER — TELEPHONE (OUTPATIENT)
Dept: PRIMARY CARE | Facility: CLINIC | Age: 79
End: 2024-10-07
Payer: COMMERCIAL

## 2024-10-07 NOTE — TELEPHONE ENCOUNTER
Called patient and lvm to schedule TCM appointment. Offered Monday, October 14th at 10 AM.        ----- Message from Loretta Zapata sent at 10/4/2024  2:39 PM EDT -----  Regarding: tcm  Discharge facility: [facility]  Discharge diagnosis: E. coli UTI with E. coli sepsis   Admission date: 10/1/24  Discharge date: 10/3/24       No PCP appointments available within 14 days of discharge  Please reach out to patient and schedule an appointment within 7-13 days from discharge date.   10/16/24

## 2024-10-08 NOTE — TELEPHONE ENCOUNTER
Spoke with patient's son and he could bring her in next Wednesday 10.16.24 for TCM. Would you be willing to squeeze her in?

## 2024-10-16 ENCOUNTER — APPOINTMENT (OUTPATIENT)
Dept: PRIMARY CARE | Facility: CLINIC | Age: 79
End: 2024-10-16
Payer: COMMERCIAL

## 2024-10-16 ENCOUNTER — LAB (OUTPATIENT)
Dept: LAB | Facility: LAB | Age: 79
End: 2024-10-16
Payer: COMMERCIAL

## 2024-10-16 VITALS
SYSTOLIC BLOOD PRESSURE: 140 MMHG | DIASTOLIC BLOOD PRESSURE: 80 MMHG | BODY MASS INDEX: 35.75 KG/M2 | HEIGHT: 63 IN | WEIGHT: 201.8 LBS | HEART RATE: 57 BPM | OXYGEN SATURATION: 95 %

## 2024-10-16 DIAGNOSIS — Z09 HOSPITAL DISCHARGE FOLLOW-UP: Primary | ICD-10-CM

## 2024-10-16 DIAGNOSIS — R12 HEARTBURN: ICD-10-CM

## 2024-10-16 DIAGNOSIS — N18.32 STAGE 3B CHRONIC KIDNEY DISEASE (MULTI): ICD-10-CM

## 2024-10-16 DIAGNOSIS — A41.51 SEPSIS DUE TO ESCHERICHIA COLI, UNSPECIFIED WHETHER ACUTE ORGAN DYSFUNCTION PRESENT (MULTI): ICD-10-CM

## 2024-10-16 DIAGNOSIS — I10 PRIMARY HYPERTENSION: ICD-10-CM

## 2024-10-16 DIAGNOSIS — E66.01 OBESITY, MORBID (MULTI): ICD-10-CM

## 2024-10-16 LAB
ANION GAP SERPL CALC-SCNC: 17 MMOL/L (ref 10–20)
BUN SERPL-MCNC: 38 MG/DL (ref 6–23)
CALCIUM SERPL-MCNC: 10.4 MG/DL (ref 8.6–10.6)
CHLORIDE SERPL-SCNC: 101 MMOL/L (ref 98–107)
CO2 SERPL-SCNC: 25 MMOL/L (ref 21–32)
CREAT SERPL-MCNC: 1.18 MG/DL (ref 0.5–1.05)
EGFRCR SERPLBLD CKD-EPI 2021: 47 ML/MIN/1.73M*2
GLUCOSE SERPL-MCNC: 109 MG/DL (ref 74–99)
POTASSIUM SERPL-SCNC: 4.5 MMOL/L (ref 3.5–5.3)
SODIUM SERPL-SCNC: 138 MMOL/L (ref 136–145)

## 2024-10-16 PROCEDURE — 36415 COLL VENOUS BLD VENIPUNCTURE: CPT

## 2024-10-16 PROCEDURE — 81001 URINALYSIS AUTO W/SCOPE: CPT

## 2024-10-16 PROCEDURE — 80048 BASIC METABOLIC PNL TOTAL CA: CPT

## 2024-10-16 PROCEDURE — 87086 URINE CULTURE/COLONY COUNT: CPT

## 2024-10-16 RX ORDER — OMEPRAZOLE 20 MG/1
20 CAPSULE, DELAYED RELEASE ORAL DAILY
Qty: 90 CAPSULE | Refills: 0 | Status: SHIPPED | OUTPATIENT
Start: 2024-10-16 | End: 2025-01-14

## 2024-10-16 ASSESSMENT — PATIENT HEALTH QUESTIONNAIRE - PHQ9
SUM OF ALL RESPONSES TO PHQ9 QUESTIONS 1 AND 2: 0
1. LITTLE INTEREST OR PLEASURE IN DOING THINGS: NOT AT ALL
2. FEELING DOWN, DEPRESSED OR HOPELESS: NOT AT ALL

## 2024-10-16 NOTE — PROGRESS NOTES
"Patient: Christelle Paul  : 1945  PCP: Margoth Ceballos MD  MRN: [id number]  Program: Transitional Care Management  Status: Enrolled  Effective Dates: 10/4/2024 - present  Responsible Staff: Loretta Zapata RN  Social Drivers to be Addressed: Alcohol Use, Financial Resource Strain, Physical Activity, Social Connections, Stress, Transportation Needs         Christelle Paul is a 79 y.o. female presenting today for follow-up after being discharged from the hospital 13 days ago. The main problem requiring admission was urosepsis. The discharge summary and/or Transitional Care Management documentation was reviewed. Medication reconciliation was performed as indicated via the \"Jayson as Reviewed\" timestamp.     Christelle Paul was contacted by Transitional Care Management services two days after her discharge. This encounter and supporting documentation was reviewed.  Patient was in the ER with fever and chills.  Diagnosed with UTI and was sent home on antibiotics.  Later she was called back since blood culture was positive.  Patient was admitted and had IV antibiotics.  Seen by infectious disease in the hospital  Discharged on Augmentin.  She has finished Augmentin  No fever.  No UTI symptoms  No side effects from medication  Has been able to walk without difficulty  Taking BP meds as prescribed.  No chest pain or worsening shortness of breath  Potassium was high while in the hospital    Seeing oncology at [facility].  No recurrence of breast cancer.  On letrozole    Has been having heartburn symptoms daily.  Would like medication.  No dysphagia.  No bowel changes  Review of Systems  No fever chills or night sweats  No neck pain or sore throat  No anxiety or depression  No worsening leg swelling  /80   Pulse 57   Ht 1.6 m (5' 3\")   Wt 91.5 kg (201 lb 12.8 oz)   SpO2 95%   BMI 35.75 kg/m²     Physical Exam  Not in acute distress  No pallor or icterus  Looks tired  Oral mucosa dry  Neck supple no " goiter  Chest is clear  CVS: S1-S2 regular not tachycardic no murmur.  Trace edema bilateral which is chronic  Abdomen soft nontender no CVA tenderness bowel sounds heard  The complexity of medical decision making for this patient's transitional care is moderate.    Assessment/Plan   Problem List Items Addressed This Visit             ICD-10-CM    Hypertension I10    Stage 3b chronic kidney disease (Multi) N18.32    Relevant Orders    Basic Metabolic Panel    Obesity, morbid (Multi) E66.01    Hospital discharge follow-up - Primary Z09    Sepsis due to Escherichia coli (Multi) A41.51    Relevant Orders    Urinalysis with Reflex Culture and Microscopic    Basic Metabolic Panel    Heartburn R12    Relevant Medications    omeprazole (PriLOSEC) 20 mg DR capsule   Medication reconciliation done after hospital discharge.  Reviewed test results and notes  Will repeat urinalysis today  Repeat renal function and electrolytes  Discussed importance of hydration    Blood pressure slightly high.  No changes today  Avoid added salt in diet    Will give omeprazole for heartburn.  Need further workup if symptoms persist after few weeks    Follow-up for wellness exam and BP follow-up

## 2024-10-17 ENCOUNTER — PATIENT OUTREACH (OUTPATIENT)
Dept: PRIMARY CARE | Facility: CLINIC | Age: 79
End: 2024-10-17
Payer: COMMERCIAL

## 2024-10-17 LAB
APPEARANCE UR: ABNORMAL
BACTERIA #/AREA URNS AUTO: ABNORMAL /HPF
BILIRUB UR STRIP.AUTO-MCNC: NEGATIVE MG/DL
COLOR UR: YELLOW
GLUCOSE UR STRIP.AUTO-MCNC: NORMAL MG/DL
HOLD SPECIMEN: NORMAL
KETONES UR STRIP.AUTO-MCNC: NEGATIVE MG/DL
LEUKOCYTE ESTERASE UR QL STRIP.AUTO: ABNORMAL
MUCOUS THREADS #/AREA URNS AUTO: ABNORMAL /LPF
NITRITE UR QL STRIP.AUTO: NEGATIVE
PH UR STRIP.AUTO: 5 [PH]
PROT UR STRIP.AUTO-MCNC: NEGATIVE MG/DL
RBC # UR STRIP.AUTO: NEGATIVE /UL
RBC #/AREA URNS AUTO: ABNORMAL /HPF
SP GR UR STRIP.AUTO: 1.02
SQUAMOUS #/AREA URNS AUTO: ABNORMAL /HPF
UROBILINOGEN UR STRIP.AUTO-MCNC: NORMAL MG/DL
WBC #/AREA URNS AUTO: ABNORMAL /HPF

## 2024-10-17 NOTE — RESULT ENCOUNTER NOTE
Urine test shows white cells and red cells and bacteria.  Will wait for culture before deciding on more antibiotics.  Meanwhile push fluids  Kidney function is stable

## 2024-10-17 NOTE — PROGRESS NOTES
Unable to reach patient for call back after recent hospitalization.   LVM with call back number for patient to call if needed

## 2024-10-18 LAB — BACTERIA UR CULT: NORMAL

## 2024-10-18 NOTE — RESULT ENCOUNTER NOTE
Culture is negative.no further antibiotics at this time.drink plenty of fluids all other ROS negative except as per HPI

## 2024-10-28 ENCOUNTER — TELEPHONE (OUTPATIENT)
Dept: PRIMARY CARE | Facility: CLINIC | Age: 79
End: 2024-10-28
Payer: COMMERCIAL

## 2024-11-14 ENCOUNTER — PATIENT OUTREACH (OUTPATIENT)
Dept: PRIMARY CARE | Facility: CLINIC | Age: 79
End: 2024-11-14
Payer: COMMERCIAL

## 2024-11-17 DIAGNOSIS — I10 PRIMARY HYPERTENSION: ICD-10-CM

## 2024-11-18 RX ORDER — SPIRONOLACTONE 50 MG/1
50 TABLET, FILM COATED ORAL DAILY
Qty: 90 TABLET | Refills: 3 | Status: SHIPPED | OUTPATIENT
Start: 2024-11-18

## 2024-12-18 ENCOUNTER — APPOINTMENT (OUTPATIENT)
Dept: PRIMARY CARE | Facility: CLINIC | Age: 79
End: 2024-12-18
Payer: COMMERCIAL

## 2024-12-18 VITALS
BODY MASS INDEX: 36.23 KG/M2 | DIASTOLIC BLOOD PRESSURE: 80 MMHG | HEART RATE: 64 BPM | HEIGHT: 63 IN | SYSTOLIC BLOOD PRESSURE: 130 MMHG | OXYGEN SATURATION: 96 % | WEIGHT: 204.5 LBS

## 2024-12-18 DIAGNOSIS — Z00.00 ROUTINE GENERAL MEDICAL EXAMINATION AT HEALTH CARE FACILITY: Primary | ICD-10-CM

## 2024-12-18 DIAGNOSIS — I10 PRIMARY HYPERTENSION: ICD-10-CM

## 2024-12-18 PROCEDURE — 1159F MED LIST DOCD IN RCRD: CPT | Performed by: INTERNAL MEDICINE

## 2024-12-18 PROCEDURE — 1123F ACP DISCUSS/DSCN MKR DOCD: CPT | Performed by: INTERNAL MEDICINE

## 2024-12-18 PROCEDURE — 1158F ADVNC CARE PLAN TLK DOCD: CPT | Performed by: INTERNAL MEDICINE

## 2024-12-18 PROCEDURE — 3075F SYST BP GE 130 - 139MM HG: CPT | Performed by: INTERNAL MEDICINE

## 2024-12-18 PROCEDURE — 1036F TOBACCO NON-USER: CPT | Performed by: INTERNAL MEDICINE

## 2024-12-18 PROCEDURE — 1160F RVW MEDS BY RX/DR IN RCRD: CPT | Performed by: INTERNAL MEDICINE

## 2024-12-18 PROCEDURE — G0439 PPPS, SUBSEQ VISIT: HCPCS | Performed by: INTERNAL MEDICINE

## 2024-12-18 PROCEDURE — 1170F FXNL STATUS ASSESSED: CPT | Performed by: INTERNAL MEDICINE

## 2024-12-18 PROCEDURE — 3079F DIAST BP 80-89 MM HG: CPT | Performed by: INTERNAL MEDICINE

## 2024-12-18 PROCEDURE — 99212 OFFICE O/P EST SF 10 MIN: CPT | Performed by: INTERNAL MEDICINE

## 2024-12-18 ASSESSMENT — ACTIVITIES OF DAILY LIVING (ADL)
MANAGING_FINANCES: INDEPENDENT
BATHING: INDEPENDENT
TAKING_MEDICATION: INDEPENDENT
DOING_HOUSEWORK: INDEPENDENT
DRESSING: INDEPENDENT
GROCERY_SHOPPING: INDEPENDENT

## 2024-12-18 NOTE — PROGRESS NOTES
"Subjective   Reason for Visit: Christelle Paul is an 79 y.o. female here for a Medicare Wellness visit.     Past Medical, Surgical, and Family History reviewed and updated in chart.    Reviewed all medications by prescribing practitioner or clinical pharmacist (such as prescriptions, OTCs, herbal therapies and supplements) and documented in the medical record.    HPI  Taking BP meds as prescribed.  Does not check at home  No chest pain no shortness of breath  Sometimes ankles swell.  Swelling is more on left side.  Some days does not have much swelling  Diet does have salty foods  No UTI symptoms  Up-to-date with oncology follow-up  Patient Care Team:  Margoth Ceballos MD as PCP - General (Internal Medicine)  Margoth Ceballos MD as PCP - [facility] ACO Attributed Provider  Margoth Ceballos MD as PCP - [facility] PCP  Loretta Zapata RN as Care Manager (Case Management)     Review of Systems  No fever chills night sweats  No headache  No significant back pain  No anxiety or depression  No insomnia  Objective   Vitals:  /80   Pulse 64   Ht 1.6 m (5' 3\")   Wt 92.8 kg (204 lb 8 oz)   SpO2 96%   BMI 36.23 kg/m²       Physical Exam  Not in acute distress  No pallor or icterus  Neck supple no goiter  Chest is clear  CVS: S1-S2 regular not tachycardic no murmur.  Trace edema bilateral which is chronic.  Left side is worse than right and has some varicosities  Assessment & Plan  Routine general medical examination at health care facility  Wellness exam and counseling completed  Has good functional status.  Does not drive  Son is power of   Orders:    1 Year Follow Up In Primary Care - Wellness Exam; Future    Primary hypertension  Blood pressure is better controlled.  Continue same treatment.  Decrease salt intake       Leg edema multifactorial.  Has varicose veins and some lymphedema.  Also on medications.  Discussed decreasing salt and sugar in diet and also elevating legs and try wearing compression " socks  Do stretches and leg braces  Elevate at night    Follow-up in 6 months and as needed

## 2024-12-20 ENCOUNTER — PATIENT OUTREACH (OUTPATIENT)
Dept: PRIMARY CARE | Facility: CLINIC | Age: 79
End: 2024-12-20
Payer: MEDICARE

## 2025-01-18 DIAGNOSIS — R12 HEARTBURN: ICD-10-CM

## 2025-01-20 RX ORDER — OMEPRAZOLE 20 MG/1
20 CAPSULE, DELAYED RELEASE ORAL DAILY
Qty: 90 CAPSULE | Refills: 0 | Status: SHIPPED | OUTPATIENT
Start: 2025-01-20 | End: 2025-04-20

## 2025-01-29 DIAGNOSIS — E78.2 HYPERLIPIDEMIA, MIXED: ICD-10-CM

## 2025-01-29 DIAGNOSIS — I10 PRIMARY HYPERTENSION: ICD-10-CM

## 2025-01-29 RX ORDER — BISOPROLOL FUMARATE 10 MG/1
10 TABLET, FILM COATED ORAL DAILY
Qty: 90 TABLET | Refills: 3 | Status: SHIPPED | OUTPATIENT
Start: 2025-01-29

## 2025-01-29 RX ORDER — FENOFIBRATE 145 MG/1
145 TABLET, FILM COATED ORAL DAILY
Qty: 90 TABLET | Refills: 3 | Status: SHIPPED | OUTPATIENT
Start: 2025-01-29 | End: 2026-01-29

## 2025-04-19 DIAGNOSIS — R12 HEARTBURN: ICD-10-CM

## 2025-04-21 RX ORDER — OMEPRAZOLE 20 MG/1
20 CAPSULE, DELAYED RELEASE ORAL DAILY
Qty: 90 CAPSULE | Refills: 0 | Status: SHIPPED | OUTPATIENT
Start: 2025-04-21 | End: 2025-07-20

## 2025-07-26 DIAGNOSIS — R12 HEARTBURN: ICD-10-CM

## 2025-07-28 RX ORDER — OMEPRAZOLE 20 MG/1
20 CAPSULE, DELAYED RELEASE ORAL DAILY
Qty: 90 CAPSULE | Refills: 0 | Status: SHIPPED | OUTPATIENT
Start: 2025-07-28 | End: 2025-10-26